# Patient Record
Sex: MALE | Race: WHITE | NOT HISPANIC OR LATINO | Employment: OTHER | URBAN - METROPOLITAN AREA
[De-identification: names, ages, dates, MRNs, and addresses within clinical notes are randomized per-mention and may not be internally consistent; named-entity substitution may affect disease eponyms.]

---

## 2018-01-12 NOTE — MISCELLANEOUS
Message  GI Reminder Recall [facility]:   Date: 04/19/2016   Dear Jodie Ojeda:     Review of our records shows you are due for the following: Colonoscopy  Please call the following office to schedule your appointment:   [address] [facility], [address], [place], [address] [place] [address]  [phone]  We look forward to hearing from you! Sincerely,         Signatures   Electronically signed by :  Karlo Coronado, ; Apr 19 2016  3:42PM EST                       (Author)

## 2023-11-16 ENCOUNTER — APPOINTMENT (EMERGENCY)
Dept: RADIOLOGY | Facility: HOSPITAL | Age: 88
End: 2023-11-16
Payer: COMMERCIAL

## 2023-11-16 ENCOUNTER — HOSPITAL ENCOUNTER (INPATIENT)
Facility: HOSPITAL | Age: 88
LOS: 5 days | Discharge: HOME HEALTH | End: 2023-11-21
Attending: EMERGENCY MEDICINE | Admitting: INTERNAL MEDICINE
Payer: COMMERCIAL

## 2023-11-16 DIAGNOSIS — E87.20 METABOLIC ACIDOSIS, NAG, BICARBONATE LOSSES: ICD-10-CM

## 2023-11-16 DIAGNOSIS — R19.5 OCCULT BLOOD POSITIVE STOOL: ICD-10-CM

## 2023-11-16 DIAGNOSIS — D64.9 ANEMIA REQUIRING TRANSFUSIONS: ICD-10-CM

## 2023-11-16 DIAGNOSIS — I50.9 HEART FAILURE (HCC): ICD-10-CM

## 2023-11-16 DIAGNOSIS — I35.1 MODERATE AORTIC REGURGITATION: ICD-10-CM

## 2023-11-16 DIAGNOSIS — R53.83 OTHER FATIGUE: Primary | ICD-10-CM

## 2023-11-16 DIAGNOSIS — J90 PLEURAL EFFUSION: ICD-10-CM

## 2023-11-16 DIAGNOSIS — R53.1 GENERALIZED WEAKNESS: ICD-10-CM

## 2023-11-16 PROBLEM — E87.1 HYPONATREMIA: Status: ACTIVE | Noted: 2023-11-16

## 2023-11-16 PROBLEM — C61 PROSTATE CANCER (HCC): Status: ACTIVE | Noted: 2023-11-16

## 2023-11-16 PROBLEM — E11.9 DIABETES (HCC): Status: ACTIVE | Noted: 2023-11-16

## 2023-11-16 LAB
2HR DELTA HS TROPONIN: 1 NG/L
4HR DELTA HS TROPONIN: 4 NG/L
ABO GROUP BLD: NORMAL
ABO GROUP BLD: NORMAL
ALBUMIN SERPL BCP-MCNC: 3.1 G/DL (ref 3.5–5)
ALP SERPL-CCNC: 293 U/L (ref 34–104)
ALT SERPL W P-5'-P-CCNC: 13 U/L (ref 7–52)
ANION GAP SERPL CALCULATED.3IONS-SCNC: 8 MMOL/L
AST SERPL W P-5'-P-CCNC: 14 U/L (ref 13–39)
BACTERIA UR QL AUTO: ABNORMAL /HPF
BASE EX.OXY STD BLDV CALC-SCNC: 55.7 % (ref 60–80)
BASE EXCESS BLDV CALC-SCNC: -8.1 MMOL/L
BASOPHILS # BLD AUTO: 0.04 THOUSANDS/ÂΜL (ref 0–0.1)
BASOPHILS NFR BLD AUTO: 1 % (ref 0–1)
BILIRUB SERPL-MCNC: 0.26 MG/DL (ref 0.2–1)
BILIRUB UR QL STRIP: NEGATIVE
BLD GP AB SCN SERPL QL: NEGATIVE
BUN SERPL-MCNC: 42 MG/DL (ref 5–25)
CALCIUM ALBUM COR SERPL-MCNC: 8.2 MG/DL (ref 8.3–10.1)
CALCIUM SERPL-MCNC: 7.5 MG/DL (ref 8.4–10.2)
CARDIAC TROPONIN I PNL SERPL HS: 11 NG/L
CARDIAC TROPONIN I PNL SERPL HS: 12 NG/L
CARDIAC TROPONIN I PNL SERPL HS: 15 NG/L
CHLORIDE SERPL-SCNC: 104 MMOL/L (ref 96–108)
CLARITY UR: ABNORMAL
CO2 SERPL-SCNC: 17 MMOL/L (ref 21–32)
COLOR UR: YELLOW
CREAT SERPL-MCNC: 1.29 MG/DL (ref 0.6–1.3)
EOSINOPHIL # BLD AUTO: 0.1 THOUSAND/ÂΜL (ref 0–0.61)
EOSINOPHIL NFR BLD AUTO: 1 % (ref 0–6)
ERYTHROCYTE [DISTWIDTH] IN BLOOD BY AUTOMATED COUNT: 15.5 % (ref 11.6–15.1)
GFR SERPL CREATININE-BSD FRML MDRD: 49 ML/MIN/1.73SQ M
GLUCOSE SERPL-MCNC: 129 MG/DL (ref 65–140)
GLUCOSE SERPL-MCNC: 196 MG/DL (ref 65–140)
GLUCOSE UR STRIP-MCNC: NEGATIVE MG/DL
HCO3 BLDV-SCNC: 17.6 MMOL/L (ref 24–30)
HCT VFR BLD AUTO: 23.3 % (ref 36.5–49.3)
HGB BLD-MCNC: 7.5 G/DL (ref 12–17)
HGB UR QL STRIP.AUTO: ABNORMAL
HYALINE CASTS #/AREA URNS LPF: ABNORMAL /LPF
IMM GRANULOCYTES # BLD AUTO: 0.15 THOUSAND/UL (ref 0–0.2)
IMM GRANULOCYTES NFR BLD AUTO: 2 % (ref 0–2)
KETONES UR STRIP-MCNC: NEGATIVE MG/DL
LACTATE SERPL-SCNC: 0.4 MMOL/L (ref 0.5–2)
LEUKOCYTE ESTERASE UR QL STRIP: ABNORMAL
LYMPHOCYTES # BLD AUTO: 2.89 THOUSANDS/ÂΜL (ref 0.6–4.47)
LYMPHOCYTES NFR BLD AUTO: 41 % (ref 14–44)
MCH RBC QN AUTO: 31.3 PG (ref 26.8–34.3)
MCHC RBC AUTO-ENTMCNC: 32.2 G/DL (ref 31.4–37.4)
MCV RBC AUTO: 97 FL (ref 82–98)
MONOCYTES # BLD AUTO: 0.79 THOUSAND/ÂΜL (ref 0.17–1.22)
MONOCYTES NFR BLD AUTO: 11 % (ref 4–12)
MUCOUS THREADS UR QL AUTO: ABNORMAL
NEUTROPHILS # BLD AUTO: 3.13 THOUSANDS/ÂΜL (ref 1.85–7.62)
NEUTS SEG NFR BLD AUTO: 44 % (ref 43–75)
NITRITE UR QL STRIP: NEGATIVE
NON-SQ EPI CELLS URNS QL MICRO: ABNORMAL /HPF
NRBC BLD AUTO-RTO: 0 /100 WBCS
O2 CT BLDV-SCNC: 7 ML/DL
PCO2 BLDV: 36.8 MM HG (ref 42–50)
PH BLDV: 7.3 [PH] (ref 7.3–7.4)
PH UR STRIP.AUTO: 6 [PH]
PLATELET # BLD AUTO: 184 THOUSANDS/UL (ref 149–390)
PMV BLD AUTO: 10.1 FL (ref 8.9–12.7)
PO2 BLDV: 33.2 MM HG (ref 35–45)
POTASSIUM SERPL-SCNC: 4.9 MMOL/L (ref 3.5–5.3)
PROT SERPL-MCNC: 5.5 G/DL (ref 6.4–8.4)
PROT UR STRIP-MCNC: ABNORMAL MG/DL
RBC # BLD AUTO: 2.4 MILLION/UL (ref 3.88–5.62)
RBC #/AREA URNS AUTO: ABNORMAL /HPF
RH BLD: POSITIVE
RH BLD: POSITIVE
SODIUM SERPL-SCNC: 129 MMOL/L (ref 135–147)
SP GR UR STRIP.AUTO: 1.01 (ref 1–1.03)
SPECIMEN EXPIRATION DATE: NORMAL
UROBILINOGEN UR STRIP-ACNC: <2 MG/DL
WBC # BLD AUTO: 7.1 THOUSAND/UL (ref 4.31–10.16)
WBC #/AREA URNS AUTO: ABNORMAL /HPF

## 2023-11-16 PROCEDURE — 85018 HEMOGLOBIN: CPT

## 2023-11-16 PROCEDURE — 86850 RBC ANTIBODY SCREEN: CPT

## 2023-11-16 PROCEDURE — 85025 COMPLETE CBC W/AUTO DIFF WBC: CPT | Performed by: EMERGENCY MEDICINE

## 2023-11-16 PROCEDURE — 30233N1 TRANSFUSION OF NONAUTOLOGOUS RED BLOOD CELLS INTO PERIPHERAL VEIN, PERCUTANEOUS APPROACH: ICD-10-PCS | Performed by: HOSPITALIST

## 2023-11-16 PROCEDURE — 87086 URINE CULTURE/COLONY COUNT: CPT

## 2023-11-16 PROCEDURE — 86900 BLOOD TYPING SEROLOGIC ABO: CPT

## 2023-11-16 PROCEDURE — 80053 COMPREHEN METABOLIC PANEL: CPT | Performed by: EMERGENCY MEDICINE

## 2023-11-16 PROCEDURE — 86920 COMPATIBILITY TEST SPIN: CPT

## 2023-11-16 PROCEDURE — 87077 CULTURE AEROBIC IDENTIFY: CPT

## 2023-11-16 PROCEDURE — 96365 THER/PROPH/DIAG IV INF INIT: CPT

## 2023-11-16 PROCEDURE — P9016 RBC LEUKOCYTES REDUCED: HCPCS

## 2023-11-16 PROCEDURE — 87186 SC STD MICRODIL/AGAR DIL: CPT

## 2023-11-16 PROCEDURE — 71045 X-RAY EXAM CHEST 1 VIEW: CPT

## 2023-11-16 PROCEDURE — 83605 ASSAY OF LACTIC ACID: CPT | Performed by: EMERGENCY MEDICINE

## 2023-11-16 PROCEDURE — 36415 COLL VENOUS BLD VENIPUNCTURE: CPT

## 2023-11-16 PROCEDURE — 82948 REAGENT STRIP/BLOOD GLUCOSE: CPT

## 2023-11-16 PROCEDURE — 99223 1ST HOSP IP/OBS HIGH 75: CPT | Performed by: HOSPITALIST

## 2023-11-16 PROCEDURE — 99285 EMERGENCY DEPT VISIT HI MDM: CPT

## 2023-11-16 PROCEDURE — 86901 BLOOD TYPING SEROLOGIC RH(D): CPT

## 2023-11-16 PROCEDURE — 84484 ASSAY OF TROPONIN QUANT: CPT | Performed by: EMERGENCY MEDICINE

## 2023-11-16 PROCEDURE — 0241U HB NFCT DS VIR RESP RNA 4 TRGT: CPT

## 2023-11-16 PROCEDURE — 82805 BLOOD GASES W/O2 SATURATION: CPT

## 2023-11-16 PROCEDURE — 96368 THER/DIAG CONCURRENT INF: CPT

## 2023-11-16 PROCEDURE — 81001 URINALYSIS AUTO W/SCOPE: CPT

## 2023-11-16 PROCEDURE — 36430 TRANSFUSION BLD/BLD COMPNT: CPT

## 2023-11-16 PROCEDURE — 93005 ELECTROCARDIOGRAM TRACING: CPT

## 2023-11-16 PROCEDURE — 99291 CRITICAL CARE FIRST HOUR: CPT | Performed by: EMERGENCY MEDICINE

## 2023-11-16 RX ORDER — SODIUM BICARBONATE 650 MG/1
650 TABLET ORAL 2 TIMES DAILY
Status: ON HOLD | COMMUNITY
Start: 2023-11-16 | End: 2023-11-21

## 2023-11-16 RX ORDER — DIPHENOXYLATE HYDROCHLORIDE AND ATROPINE SULFATE 2.5; .025 MG/1; MG/1
1 TABLET ORAL DAILY
COMMUNITY

## 2023-11-16 RX ORDER — ACETAMINOPHEN 500 MG
TABLET ORAL
COMMUNITY
End: 2023-11-16

## 2023-11-16 RX ORDER — POLYETHYLENE GLYCOL 3350, SODIUM SULFATE ANHYDROUS, SODIUM BICARBONATE, SODIUM CHLORIDE, POTASSIUM CHLORIDE 227.1; 21.5; 6.36; 5.53; .754 G/L; G/L; G/L; G/L; G/L
POWDER, FOR SOLUTION ORAL
COMMUNITY
Start: 2014-07-29 | End: 2023-11-16

## 2023-11-16 RX ORDER — INSULIN LISPRO 100 [IU]/ML
1-6 INJECTION, SOLUTION INTRAVENOUS; SUBCUTANEOUS
Status: DISCONTINUED | OUTPATIENT
Start: 2023-11-16 | End: 2023-11-21 | Stop reason: HOSPADM

## 2023-11-16 RX ORDER — HYDROCHLOROTHIAZIDE 25 MG/1
TABLET ORAL
COMMUNITY
End: 2023-11-16

## 2023-11-16 RX ORDER — VERAPAMIL HYDROCHLORIDE 240 MG/1
CAPSULE, EXTENDED RELEASE ORAL
COMMUNITY
End: 2023-11-16

## 2023-11-16 RX ORDER — AMLODIPINE BESYLATE 5 MG/1
1 TABLET ORAL DAILY
COMMUNITY
Start: 2023-09-13

## 2023-11-16 RX ORDER — ROSUVASTATIN CALCIUM 10 MG/1
1 TABLET, COATED ORAL DAILY
COMMUNITY
Start: 2023-06-21

## 2023-11-16 RX ORDER — RAMIPRIL 10 MG/1
CAPSULE ORAL
COMMUNITY
End: 2023-11-16

## 2023-11-16 RX ORDER — MAGNESIUM CARB/ALUMINUM HYDROX 105-160MG
TABLET,CHEWABLE ORAL
COMMUNITY
Start: 2014-07-29 | End: 2023-11-16

## 2023-11-16 RX ORDER — BICALUTAMIDE 50 MG/1
50 TABLET, FILM COATED ORAL DAILY
Status: DISCONTINUED | OUTPATIENT
Start: 2023-11-17 | End: 2023-11-21 | Stop reason: HOSPADM

## 2023-11-16 RX ORDER — LISINOPRIL 20 MG/1
20 TABLET ORAL DAILY
COMMUNITY
Start: 2023-11-15 | End: 2023-11-16

## 2023-11-16 RX ORDER — SODIUM BICARBONATE 650 MG/1
650 TABLET ORAL 2 TIMES DAILY
Status: DISCONTINUED | OUTPATIENT
Start: 2023-11-16 | End: 2023-11-18

## 2023-11-16 RX ORDER — PANTOPRAZOLE SODIUM 20 MG/1
20 TABLET, DELAYED RELEASE ORAL
Status: DISCONTINUED | OUTPATIENT
Start: 2023-11-17 | End: 2023-11-17

## 2023-11-16 RX ORDER — FERROUS SULFATE 325(65) MG
1 TABLET ORAL
COMMUNITY

## 2023-11-16 RX ORDER — PRAVASTATIN SODIUM 80 MG/1
80 TABLET ORAL
Status: DISCONTINUED | OUTPATIENT
Start: 2023-11-17 | End: 2023-11-21 | Stop reason: HOSPADM

## 2023-11-16 RX ORDER — CALCIUM GLUCONATE 20 MG/ML
1 INJECTION, SOLUTION INTRAVENOUS ONCE
Status: COMPLETED | OUTPATIENT
Start: 2023-11-16 | End: 2023-11-16

## 2023-11-16 RX ORDER — ENOXAPARIN SODIUM 100 MG/ML
40 INJECTION SUBCUTANEOUS DAILY
Status: DISCONTINUED | OUTPATIENT
Start: 2023-11-17 | End: 2023-11-16

## 2023-11-16 RX ORDER — CARVEDILOL 25 MG/1
25 TABLET ORAL
COMMUNITY
Start: 2023-05-24

## 2023-11-16 RX ORDER — ATROPINE SULFATE 0.1 MG/ML
1 SYRINGE (ML) INJECTION ONCE
Status: COMPLETED | OUTPATIENT
Start: 2023-11-16 | End: 2023-11-16

## 2023-11-16 RX ORDER — ALFUZOSIN HYDROCHLORIDE 10 MG/1
TABLET, EXTENDED RELEASE ORAL
COMMUNITY
End: 2023-11-16

## 2023-11-16 RX ORDER — POTASSIUM CHLORIDE 1.5 G/1.58G
POWDER, FOR SOLUTION ORAL
COMMUNITY
End: 2023-11-16

## 2023-11-16 RX ORDER — AMLODIPINE BESYLATE 5 MG/1
5 TABLET ORAL DAILY
Status: DISCONTINUED | OUTPATIENT
Start: 2023-11-17 | End: 2023-11-21 | Stop reason: HOSPADM

## 2023-11-16 RX ORDER — DOCUSATE SODIUM 100 MG/1
100 CAPSULE, LIQUID FILLED ORAL 2 TIMES DAILY
COMMUNITY

## 2023-11-16 RX ORDER — OMEPRAZOLE 20 MG/1
1 CAPSULE, DELAYED RELEASE ORAL 2 TIMES DAILY
COMMUNITY
Start: 2014-09-04

## 2023-11-16 RX ORDER — BICALUTAMIDE 50 MG/1
50 TABLET, FILM COATED ORAL DAILY
COMMUNITY
Start: 2023-10-09 | End: 2024-10-08

## 2023-11-16 RX ORDER — SODIUM CHLORIDE 9 MG/ML
75 INJECTION, SOLUTION INTRAVENOUS CONTINUOUS
Status: DISCONTINUED | OUTPATIENT
Start: 2023-11-16 | End: 2023-11-17

## 2023-11-16 RX ORDER — FERROUS SULFATE 325(65) MG
325 TABLET ORAL
Status: DISCONTINUED | OUTPATIENT
Start: 2023-11-17 | End: 2023-11-21 | Stop reason: HOSPADM

## 2023-11-16 RX ORDER — FLUTICASONE PROPIONATE 50 MCG
2 SPRAY, SUSPENSION (ML) NASAL DAILY
Status: DISCONTINUED | OUTPATIENT
Start: 2023-11-17 | End: 2023-11-21 | Stop reason: HOSPADM

## 2023-11-16 RX ORDER — CARVEDILOL 12.5 MG/1
25 TABLET ORAL 2 TIMES DAILY WITH MEALS
Status: DISCONTINUED | OUTPATIENT
Start: 2023-11-17 | End: 2023-11-21 | Stop reason: HOSPADM

## 2023-11-16 RX ORDER — FLUTICASONE PROPIONATE 50 MCG
2 SPRAY, SUSPENSION (ML) NASAL DAILY
COMMUNITY

## 2023-11-16 RX ORDER — SOD.CHLORID/POTASSIUM CHLORIDE 287-180-15
2 TABLET ORAL DAILY
COMMUNITY
Start: 2023-09-27

## 2023-11-16 RX ORDER — DOCUSATE SODIUM 100 MG/1
100 CAPSULE, LIQUID FILLED ORAL 2 TIMES DAILY
Status: DISCONTINUED | OUTPATIENT
Start: 2023-11-16 | End: 2023-11-21 | Stop reason: HOSPADM

## 2023-11-16 RX ADMIN — SODIUM BICARBONATE 650 MG TABLET 650 MG: at 23:27

## 2023-11-16 RX ADMIN — INSULIN LISPRO 2 UNITS: 100 INJECTION, SOLUTION INTRAVENOUS; SUBCUTANEOUS at 23:27

## 2023-11-16 RX ADMIN — CALCIUM GLUCONATE 1 G: 20 INJECTION, SOLUTION INTRAVENOUS at 14:50

## 2023-11-16 RX ADMIN — SODIUM CHLORIDE 75 ML/HR: 0.9 INJECTION, SOLUTION INTRAVENOUS at 20:12

## 2023-11-16 RX ADMIN — CEFTRIAXONE SODIUM 1000 MG: 10 INJECTION, POWDER, FOR SOLUTION INTRAVENOUS at 14:42

## 2023-11-16 NOTE — ED PROVIDER NOTES
History  Chief Complaint   Patient presents with    Weakness - Generalized     Called d/t increased weakness, SOB with exertion and some CP. Pt has Cancer with generalized mets. Started new Chemo med recently and since then has no energy and feels like he is dragging. Upon arrival EMS noted him to be sanju and hypotensive. Pushed Atropine in route and pt converted to AFIB in the 70's     Patient is a 70-year-old male with metastatic prostate cancer who presented to the ED for weakness. Patient stated that he has had increased weakness for the last 2 weeks and associated worsening shortness of breath on exertion. Patient states he has had some degree of shortness of breath on exertion before but has been significantly worse in the last couple weeks. He came to the ED via EMS this morning as he noticed in the last couple days the weakness and shortness of breath on exertion has been significantly worse even compared to the last 2 weeks and his family member was concerned and called EMS. He also stated that he had 1 episode of chest pain this morning shortly after awakening while lying in bed. Chest pain was described as 4 out of 10, substernal, nonradiating. He stated that he thinks he has had some mild chest pain in the past but not sure. Notably, he states that he recently switched to a new medication for his metastatic prostate cancer, bicalutamide. He also states he has been eating less the last couple days and has had decreased appetite. He denies any fevers, chills or other systemic symptoms. Prior to Admission Medications   Prescriptions Last Dose Informant Patient Reported? Taking?    Cholecalciferol 25 MCG (1000 UT) capsule   Yes No   Sig: Take 1,000 Units by mouth daily   OMEGA-3 FATTY ACIDS-VITAMIN E PO   Yes No   Sig: Take 1 tablet by mouth daily   amLODIPine (NORVASC) 5 mg tablet 11/16/2023  Yes Yes   Sig: Take 1 tablet by mouth daily   apixaban (ELIQUIS) 5 mg 11/16/2023  Yes Yes   Sig: Take 5 mg by mouth 2 (two) times a day   bicalutamide (CASODEX) 50 mg tablet 2023  Yes Yes   Sig: Take 50 mg by mouth daily   carvedilol (COREG) 25 mg tablet 2023  Yes Yes   Sig: Take 25 mg by mouth   docusate sodium (COLACE) 100 mg capsule Past Week  Yes Yes   Sig: Take 100 mg by mouth 2 (two) times a day   ferrous sulfate 325 (65 Fe) mg tablet   Yes No   Sig: Take 1 tablet by mouth daily with breakfast   fluticasone (FLONASE) 50 mcg/act nasal spray Past Week  Yes Yes   Si sprays into each nostril daily   multivitamin (THERAGRAN) TABS   Yes No   Sig: Take 1 tablet by mouth daily   omeprazole (PriLOSEC) 20 mg delayed release capsule 2023  Yes Yes   Sig: Take 1 capsule by mouth 2 (two) times a day   oral electrolytes (THERMOTABS) TABS   Yes Yes   Sig: Take 2 tablets by mouth daily   rosuvastatin (CRESTOR) 10 MG tablet   Yes Yes   Sig: Take 1 tablet by mouth daily   sitaGLIPtin (JANUVIA) 50 mg tablet 11/15/2023  Yes Yes   Sig: Take 50 mg by mouth daily   sodium bicarbonate 650 mg tablet 2023  Yes Yes   Sig: Take 650 mg by mouth 2 (two) times a day      Facility-Administered Medications: None       No past medical history on file. No past surgical history on file. No family history on file. I have reviewed and agree with the history as documented. No existing history information found. No existing history information found. Review of Systems   Constitutional:  Positive for appetite change and fatigue. Negative for chills. HENT: Negative. Respiratory:  Positive for shortness of breath. SOB on exertion   Cardiovascular:  Positive for chest pain. Negative for palpitations. Gastrointestinal:  Negative for abdominal pain, nausea and vomiting. Genitourinary:  Negative for dysuria, flank pain and urgency. Musculoskeletal:  Negative for arthralgias and neck pain. Skin:  Negative for color change and pallor.    Neurological:  Negative for dizziness, syncope and light-headedness. Psychiatric/Behavioral:  Negative for agitation, behavioral problems and confusion. All other systems reviewed and are negative. Physical Exam  ED Triage Vitals [11/16/23 1212]   Temperature Pulse Respirations Blood Pressure SpO2   97.5 °F (36.4 °C) 60 20 116/56 96 %      Temp Source Heart Rate Source Patient Position - Orthostatic VS BP Location FiO2 (%)   Oral Monitor Lying Right arm --      Pain Score       No Pain             Orthostatic Vital Signs  Vitals:    11/16/23 1800 11/16/23 1830 11/16/23 1845 11/16/23 1948   BP: 152/85 151/89 150/80 158/84   Pulse: 66 100 99 100   Patient Position - Orthostatic VS: Lying Lying Lying        Physical Exam  Vitals and nursing note reviewed. Constitutional:       Appearance: Normal appearance. HENT:      Head: Normocephalic and atraumatic. Nose: Nose normal.      Mouth/Throat:      Mouth: Mucous membranes are moist.      Pharynx: Oropharynx is clear. Eyes:      Extraocular Movements: Extraocular movements intact. Conjunctiva/sclera: Conjunctivae normal.      Pupils: Pupils are equal, round, and reactive to light. Cardiovascular:      Rate and Rhythm: Normal rate and regular rhythm. Pulses: Normal pulses. Heart sounds: Normal heart sounds. Pulmonary:      Effort: Pulmonary effort is normal.      Breath sounds: Normal breath sounds. Abdominal:      General: Abdomen is flat. Bowel sounds are normal.      Palpations: Abdomen is soft. Tenderness: There is no abdominal tenderness. Skin:     General: Skin is warm and dry. Capillary Refill: Capillary refill takes less than 2 seconds. Neurological:      General: No focal deficit present. Mental Status: He is alert and oriented to person, place, and time. Psychiatric:         Mood and Affect: Mood normal.         Behavior: Behavior normal.         Thought Content:  Thought content normal.         ED Medications  Medications   enoxaparin (LOVENOX) subcutaneous injection 40 mg (has no administration in time range)   sodium chloride 0.9 % infusion (75 mL/hr Intravenous New Bag 11/16/23 2012)   atropine (FOR EMS ONLY) 1 mg/10 mL injection 1 mg (0 mg Does not apply Given to EMS 11/16/23 1210)   calcium gluconate 1 g in sodium chloride 0.9% 50 mL (premix) (0 g Intravenous Stopped 11/16/23 1520)   ceftriaxone (ROCEPHIN) 1 g/50 mL in dextrose IVPB (0 mg Intravenous Stopped 11/16/23 1514)       Diagnostic Studies  Results Reviewed       Procedure Component Value Units Date/Time    Hemoglobin [[id number]]     Lab Status: No result Specimen: Blood     FLU/RSV/COVID - if FLU/RSV clinically relevant [[id number]] Collected: 11/16/23 1955    Lab Status: No result Specimen: Nares from Nasopharyngeal Swab     Platelet count [[id number]]     Lab Status: No result Specimen: Blood     HS Troponin I 4hr [[id number]]  (Normal) Collected: 11/16/23 1635    Lab Status: Final result Specimen: Blood from Arm, Right Updated: 11/16/23 1710     hs TnI 4hr 15 ng/L      Delta 4hr hsTnI 4 ng/L     Lactic acid, plasma (w/reflex if result > 2.0) [[id number]]  (Abnormal) Collected: 11/16/23 1440    Lab Status: Final result Specimen: Blood from Arm, Left Updated: 11/16/23 1509     LACTIC ACID 0.4 mmol/L     Narrative:      Result may be elevated if tourniquet was used during collection.     HS Troponin I 2hr [[id number]]  (Normal) Collected: 11/16/23 1353    Lab Status: Final result Specimen: Blood from Arm, Left Updated: 11/16/23 1434     hs TnI 2hr 12 ng/L      Delta 2hr hsTnI 1 ng/L     Calcium, ionized [[id number]]     Lab Status: No result Specimen: Blood     Blood gas, venous [[id number]]  (Abnormal) Collected: 11/16/23 1349    Lab Status: Final result Specimen: Blood from Arm, Left Updated: 11/16/23 1401     pH, [place] 7.298     pCO2, [place] 36.8 mm Hg      pO2, [place] 33.2 mm Hg      HCO3, [place] 17.6 mmol/L      Base Excess, [place] -8.1 mmol/L      O2 Content, [place] 7.0 ml/dL      O2 HGB, VENOUS 55.7 % Urine Microscopic [[id number]]  (Abnormal) Collected: 11/16/23 1336    Lab Status: Final result Specimen: Urine, Right Nephrostomy Updated: 11/16/23 1352     RBC, UA 10-20 /hpf      WBC, UA Innumerable /hpf      Epithelial Cells None Seen /hpf      Bacteria, UA Occasional /hpf      MUCUS THREADS Occasional     Hyaline Casts, UA 0-3 /lpf     Urine culture [[id number]] Collected: 11/16/23 1336    Lab Status:  In process Specimen: Urine, Right Nephrostomy Updated: 11/16/23 1352    UA w Reflex to Microscopic w Reflex to Culture [[id number]]  (Abnormal) Collected: 11/16/23 1336    Lab Status: Final result Specimen: Urine, Right Nephrostomy Updated: 11/16/23 1350     Color, UA Yellow     Clarity, UA Turbid     Specific Gravity, UA 1.015     pH, UA 6.0     Leukocytes, UA Large     Nitrite, UA Negative     Protein, UA 70 (1+) mg/dl      Glucose, UA Negative mg/dl      Ketones, UA Negative mg/dl      Urobilinogen, UA <2.0 mg/dl      Bilirubin, UA Negative     Occult Blood, UA Small    HS Troponin 0hr (reflex protocol) [[id number]]  (Normal) Collected: 11/16/23 1221    Lab Status: Final result Specimen: Blood from Arm, Left Updated: 11/16/23 1258     hs TnI 0hr 11 ng/L     Comprehensive metabolic panel [[id number]]  (Abnormal) Collected: 11/16/23 1221    Lab Status: Final result Specimen: Blood from Arm, Left Updated: 11/16/23 1251     Sodium 129 mmol/L      Potassium 4.9 mmol/L      Chloride 104 mmol/L      CO2 17 mmol/L      ANION GAP 8 mmol/L      BUN 42 mg/dL      Creatinine 1.29 mg/dL      Glucose 129 mg/dL      Calcium 7.5 mg/dL      Corrected Calcium 8.2 mg/dL      AST 14 U/L      ALT 13 U/L      Alkaline Phosphatase 293 U/L      Total Protein 5.5 g/dL      Albumin 3.1 g/dL      Total Bilirubin 0.26 mg/dL      eGFR 49 ml/min/1.73sq m     Narrative:      [place] guidelines for Chronic Kidney Disease (CKD):     Stage 1 with normal or high GFR (GFR > 90 mL/min/1.73 square meters)    Stage 2 Mild CKD (GFR = 60-89 mL/min/1.73 square meters)    Stage 3A Moderate CKD (GFR = 45-59 mL/min/1.73 square meters)    Stage 3B Moderate CKD (GFR = 30-44 mL/min/1.73 square meters)    Stage 4 Severe CKD (GFR = 15-29 mL/min/1.73 square meters)    Stage 5 End Stage CKD (GFR <15 mL/min/1.73 square meters)  Note: GFR calculation is accurate only with a steady state creatinine    CBC and differential [[id number]]  (Abnormal) Collected: 11/16/23 1221    Lab Status: Final result Specimen: Blood from Arm, Left Updated: 11/16/23 1237     WBC 7.10 Thousand/uL      RBC 2.40 Million/uL      Hemoglobin 7.5 g/dL      Hematocrit 23.3 %      MCV 97 fL      MCH 31.3 pg      MCHC 32.2 g/dL      RDW 15.5 %      MPV 10.1 fL      Platelets 423 Thousands/uL      nRBC 0 /100 WBCs      Neutrophils Relative 44 %      Immat GRANS % 2 %      Lymphocytes Relative 41 %      Monocytes Relative 11 %      Eosinophils Relative 1 %      Basophils Relative 1 %      Neutrophils Absolute 3.13 Thousands/µL      Immature Grans Absolute 0.15 Thousand/uL      Lymphocytes Absolute 2.89 Thousands/µL      Monocytes Absolute 0.79 Thousand/µL      Eosinophils Absolute 0.10 Thousand/µL      Basophils Absolute 0.04 Thousands/µL                    XR chest 1 view portable   Final Result by Valorie Joseph MD (11/16 7576)      Changes above are consistent with pulmonary edema and small bilateral pleural effusions. The study was marked in [facility] for immediate notification. Workstation performed: [id number]               Procedures  Procedures      ED Course  ED Course as of 11/16/23 2013   Thu Nov 16, 2023   1221 Hemoglobin(!): 7.5  Significantly reduced from CBC approximately 1 week ago with hemoglobin 10.5 . type and screen ordered   1336 WBC, UA(!): Innumerable  Suggestive of UTI, ceftriaxone ordered   1349 Blood gas, venous(!)  Suggestive of metabolic acidosis, lactate ordered   1440 Lactic acid, plasma (w/reflex if result > 2.0)(! )  Lactate negative   1442 Ceftriaxone given for UTI   1450 Calcium gluconate given in the face of low calcium and impending transfusion   1925 Patient consented for blood transfusion. Blood transfusion given for symptomatic anemia                             SBIRT 22yo+      Flowsheet Row Most Recent Value   Initial Alcohol Screen: US AUDIT-C     1. How often do you have a drink containing alcohol? 0 Filed at: 11/16/2023 1213   2. How many drinks containing alcohol do you have on a typical day you are drinking? 0 Filed at: 11/16/2023 1213   3a. Male UNDER 65: How often do you have five or more drinks on one occasion? 0 Filed at: 11/16/2023 1213   3b. FEMALE Any Age, or MALE 65+: How often do you have 4 or more drinks on one occassion? 0 Filed at: 11/16/2023 1213   Audit-C Score 0 Filed at: 11/16/2023 1213   [place]: How many times in the past year have you. .. Used an illegal drug or used a prescription medication for non-medical reasons? Never Filed at: 11/16/2023 1213                  Medical Decision Making  Patient is a 82-year-old male with metastatic prostate cancer who presented to the ED for weakness. Patient stated he has had increased weakness for the last 2 weeks and associated worsening shortness of breath on exertion. Patient states he has had some degree of shortness of breath on exertion before but has been significantly worse in the last couple weeks. He came to the ED via EMS this morning as he noticed in the last couple days the weakness and shortness of breath on exertion has been significantly worse even compared to the last 2 weeks and his family member was concerned and called EMS. He also stated that he had 1 episode of shortness of breath this morning shortly after awakening while lying in bed. Chest pain was described as 4 out of 10, substernal, nonradiating. He stated that he thinks he has had some mild chest pain in the past but not sure.   Notably, he states that he recently switched to a new medication for his metastatic prostate cancer, bicalutamide. He also states he has been eating less the last couple days and has had decreased appetite. He denies any fevers, chills or other systemic symptoms. Patient was found to have hemoglobin of 7.5. This was a significant decrease from his CBC approximately 1 week ago at [facility] which showed hemoglobin in the 10s. Patient was also significantly volume down based off the clinical picture. Consent for blood was done and patient was transfused 1 unit pRBCs in the face of symptomatic anemia. Patient was also given calcium for hypocalcemia especially given impending transfusion. Patient was found to have UA suggestive of UTI, empirically given ceftriaxone. EKG and troponin were unremarkable for ischemic process, doubt ACS/MI. Patient was admitted to medicine for further work-up and management of symptomatic anemia and UTI. Amount and/or Complexity of Data Reviewed  Labs: ordered. Radiology: ordered. Risk  Prescription drug management. Decision regarding hospitalization.           Disposition  Final diagnoses:   Other fatigue   Occult blood positive stool   Anemia requiring transfusions   Pleural effusion     Time reflects when diagnosis was documented in both MDM as applicable and the Disposition within this note       Time User Action Codes Description Comment    11/16/2023  4:15 PM Michael Bell [R53.83] Other fatigue     11/16/2023  4:16 PM Jerod Rummage Add [R19.5] Occult blood positive stool     11/16/2023  4:16 PM Jerod Rummage Add [D64.9] Anemia requiring transfusions     11/16/2023  4:16 PM Jerod Rummage Add [J18.9,  J91.8] Pleural effusion associated with pulmonary infection     11/16/2023  4:16 PM Virginia Griffith [J18.9,  J91.8] Pleural effusion associated with pulmonary infection     11/16/2023  4:16 PM Jerod Rummage Add [J90] Pleural effusion           ED Disposition       ED Disposition   Admit    Condition   Stable    Date/Time   Thu Nov 16, 2023 1615    Comment   Case was discussed with hospitalist and the patient's admission status was agreed to be Admission Status: inpatient status to the service of Dr. Au Session . Follow-up Information    None         Current Discharge Medication List        CONTINUE these medications which have NOT CHANGED    Details   amLODIPine (NORVASC) 5 mg tablet Take 1 tablet by mouth daily      apixaban (ELIQUIS) 5 mg Take 5 mg by mouth 2 (two) times a day      bicalutamide (CASODEX) 50 mg tablet Take 50 mg by mouth daily      carvedilol (COREG) 25 mg tablet Take 25 mg by mouth      docusate sodium (COLACE) 100 mg capsule Take 100 mg by mouth 2 (two) times a day      fluticasone (FLONASE) 50 mcg/act nasal spray 2 sprays into each nostril daily      omeprazole (PriLOSEC) 20 mg delayed release capsule Take 1 capsule by mouth 2 (two) times a day      oral electrolytes (THERMOTABS) TABS Take 2 tablets by mouth daily      rosuvastatin (CRESTOR) 10 MG tablet Take 1 tablet by mouth daily      sitaGLIPtin (JANUVIA) 50 mg tablet Take 50 mg by mouth daily      sodium bicarbonate 650 mg tablet Take 650 mg by mouth 2 (two) times a day      Cholecalciferol 25 MCG (1000 UT) capsule Take 1,000 Units by mouth daily      ferrous sulfate 325 (65 Fe) mg tablet Take 1 tablet by mouth daily with breakfast      multivitamin (THERAGRAN) TABS Take 1 tablet by mouth daily      OMEGA-3 FATTY ACIDS-VITAMIN E PO Take 1 tablet by mouth daily           No discharge procedures on file. PDMP Review       None             ED Provider  Attending physically available and evaluated Cristofer hWite. I managed the patient along with the ED Attending.     Electronically Signed by           Segun Bell MD  11/16/23 2013

## 2023-11-16 NOTE — ED ATTENDING ATTESTATION
11/16/2023  Dianna Asencio DO, saw and evaluated the patient. I have discussed the patient with the resident/non-physician practitioner and agree with the resident's/non-physician practitioner's findings, Plan of Care, and MDM as documented in the resident's/non-physician practitioner's note, except where noted. All available labs and Radiology studies were reviewed. I was present for key portions of any procedure(s) performed by the resident/non-physician practitioner and I was immediately available to provide assistance. 72-year-old male, past medical history metastatic prostate CA undergoing treatment, presenting to the emergency department after the last few days of increased fatigue. See resident note for full details. Vital signs stable. Patient resting comfortably. Alert and oriented x4. Conjunctiva pale. Lungs clear to auscultation. Heart regular rate and rhythm. Abdomen soft nontender. Ren in place. 72-year-old male presenting with fatigue likely secondary to symptomatic anemia with hemoglobin of 7.5. Hemoccult positive. No gross melena or hematochezia. Abdomen soft nontender. 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 However, given generalized fatigue, empirically provided Rocephin and send urine culture. 1 unit PRBC ordered for hemoglobin less than 8 that is symptomatic. Chest x-ray with concern for possible pulmonary edema however patient lying flat without orthopnea. Lungs clear to auscultation. Lower extremities with only scant edema. No indication of fluid overload at this time. EKG without acute pathology. Call placed to hospitalist medicine who accepted their care. At this point I agree with the current assessment done in the Emergency Department.   I have conducted an independent evaluation of this patient a history and physical is as follows:    ED Course  ED Course as of 11/16/23 1615   Thu Nov 16, 2023   1337 Hgb 9.7 on 11/4         Critical Care Time  CriticalCare Time    Date/Time: 11/16/2023 4:08 PM    Performed by: Benji Hennessy DO  Authorized by: Benji Hennessy DO    Critical care provider statement:     Critical care time (minutes):  40    Critical care time was exclusive of:  Separately billable procedures and treating other patients and teaching time    Critical care was necessary to treat or prevent imminent or life-threatening deterioration of the following conditions: hgb <8 requiring transfusion.     Critical care was time spent personally by me on the following activities:  Obtaining history from patient or surrogate, development of treatment plan with patient or surrogate, discussions with consultants, examination of patient, review of old charts, re-evaluation of patient's condition, ordering and review of radiographic studies, ordering and review of laboratory studies and ordering and performing treatments and interventions    I assumed direction of critical care for this patient from another provider in my specialty: no

## 2023-11-17 LAB
ABO GROUP BLD BPU: NORMAL
ALBUMIN SERPL BCP-MCNC: 3.2 G/DL (ref 3.5–5)
ALP SERPL-CCNC: 301 U/L (ref 34–104)
ALT SERPL W P-5'-P-CCNC: 12 U/L (ref 7–52)
ANION GAP SERPL CALCULATED.3IONS-SCNC: 9 MMOL/L
AST SERPL W P-5'-P-CCNC: 16 U/L (ref 13–39)
ATRIAL RATE: 76 BPM
BILIRUB SERPL-MCNC: 0.53 MG/DL (ref 0.2–1)
BPU ID: NORMAL
BUN SERPL-MCNC: 34 MG/DL (ref 5–25)
CALCIUM ALBUM COR SERPL-MCNC: 8.3 MG/DL (ref 8.3–10.1)
CALCIUM SERPL-MCNC: 7.7 MG/DL (ref 8.4–10.2)
CHLORIDE SERPL-SCNC: 106 MMOL/L (ref 96–108)
CO2 SERPL-SCNC: 16 MMOL/L (ref 21–32)
CREAT SERPL-MCNC: 0.95 MG/DL (ref 0.6–1.3)
CROSSMATCH: NORMAL
ERYTHROCYTE [DISTWIDTH] IN BLOOD BY AUTOMATED COUNT: 16.5 % (ref 11.6–15.1)
FERRITIN SERPL-MCNC: 40 NG/ML (ref 24–336)
FLUAV RNA RESP QL NAA+PROBE: NEGATIVE
FLUBV RNA RESP QL NAA+PROBE: NEGATIVE
GFR SERPL CREATININE-BSD FRML MDRD: 72 ML/MIN/1.73SQ M
GLUCOSE SERPL-MCNC: 106 MG/DL (ref 65–140)
GLUCOSE SERPL-MCNC: 108 MG/DL (ref 65–140)
GLUCOSE SERPL-MCNC: 108 MG/DL (ref 65–140)
GLUCOSE SERPL-MCNC: 128 MG/DL (ref 65–140)
GLUCOSE SERPL-MCNC: 158 MG/DL (ref 65–140)
HCT VFR BLD AUTO: 28.5 % (ref 36.5–49.3)
HGB BLD-MCNC: 9 G/DL (ref 12–17)
HGB BLD-MCNC: 9 G/DL (ref 12–17)
HGB BLD-MCNC: 9.3 G/DL (ref 12–17)
HGB BLD-MCNC: 9.4 G/DL (ref 12–17)
IRON SATN MFR SERPL: 42 % (ref 15–50)
IRON SERPL-MCNC: 113 UG/DL (ref 50–212)
MCH RBC QN AUTO: 31 PG (ref 26.8–34.3)
MCHC RBC AUTO-ENTMCNC: 33 G/DL (ref 31.4–37.4)
MCV RBC AUTO: 94 FL (ref 82–98)
PLATELET # BLD AUTO: 206 THOUSANDS/UL (ref 149–390)
PMV BLD AUTO: 10.2 FL (ref 8.9–12.7)
POTASSIUM SERPL-SCNC: 4.4 MMOL/L (ref 3.5–5.3)
PROT SERPL-MCNC: 5.8 G/DL (ref 6.4–8.4)
QRS AXIS: 112 DEGREES
QRSD INTERVAL: 148 MS
QT INTERVAL: 432 MS
QTC INTERVAL: 492 MS
RBC # BLD AUTO: 3.03 MILLION/UL (ref 3.88–5.62)
RSV RNA RESP QL NAA+PROBE: NEGATIVE
SARS-COV-2 RNA RESP QL NAA+PROBE: NEGATIVE
SODIUM SERPL-SCNC: 131 MMOL/L (ref 135–147)
T WAVE AXIS: 51 DEGREES
TIBC SERPL-MCNC: 266 UG/DL (ref 250–450)
UIBC SERPL-MCNC: 153 UG/DL (ref 155–355)
UNIT DISPENSE STATUS: NORMAL
UNIT PRODUCT CODE: NORMAL
UNIT PRODUCT VOLUME: 350 ML
UNIT RH: NORMAL
VENTRICULAR RATE: 78 BPM
WBC # BLD AUTO: 8.27 THOUSAND/UL (ref 4.31–10.16)

## 2023-11-17 PROCEDURE — 93010 ELECTROCARDIOGRAM REPORT: CPT | Performed by: INTERNAL MEDICINE

## 2023-11-17 PROCEDURE — 99223 1ST HOSP IP/OBS HIGH 75: CPT | Performed by: STUDENT IN AN ORGANIZED HEALTH CARE EDUCATION/TRAINING PROGRAM

## 2023-11-17 PROCEDURE — 82728 ASSAY OF FERRITIN: CPT

## 2023-11-17 PROCEDURE — 99232 SBSQ HOSP IP/OBS MODERATE 35: CPT | Performed by: INTERNAL MEDICINE

## 2023-11-17 PROCEDURE — C9113 INJ PANTOPRAZOLE SODIUM, VIA: HCPCS | Performed by: HOSPITALIST

## 2023-11-17 PROCEDURE — 80053 COMPREHEN METABOLIC PANEL: CPT

## 2023-11-17 PROCEDURE — 85027 COMPLETE CBC AUTOMATED: CPT

## 2023-11-17 PROCEDURE — 82948 REAGENT STRIP/BLOOD GLUCOSE: CPT

## 2023-11-17 PROCEDURE — 83550 IRON BINDING TEST: CPT

## 2023-11-17 PROCEDURE — 97530 THERAPEUTIC ACTIVITIES: CPT

## 2023-11-17 PROCEDURE — 85018 HEMOGLOBIN: CPT

## 2023-11-17 PROCEDURE — 97163 PT EVAL HIGH COMPLEX 45 MIN: CPT

## 2023-11-17 PROCEDURE — 99223 1ST HOSP IP/OBS HIGH 75: CPT | Performed by: INTERNAL MEDICINE

## 2023-11-17 PROCEDURE — 83540 ASSAY OF IRON: CPT

## 2023-11-17 RX ORDER — PANTOPRAZOLE SODIUM 40 MG/10ML
40 INJECTION, POWDER, LYOPHILIZED, FOR SOLUTION INTRAVENOUS EVERY 12 HOURS SCHEDULED
Status: DISCONTINUED | OUTPATIENT
Start: 2023-11-17 | End: 2023-11-21

## 2023-11-17 RX ORDER — BENZONATATE 100 MG/1
200 CAPSULE ORAL 3 TIMES DAILY
Status: DISCONTINUED | OUTPATIENT
Start: 2023-11-17 | End: 2023-11-21 | Stop reason: HOSPADM

## 2023-11-17 RX ADMIN — PANTOPRAZOLE SODIUM 40 MG: 40 INJECTION, POWDER, FOR SOLUTION INTRAVENOUS at 21:28

## 2023-11-17 RX ADMIN — DOCUSATE SODIUM 100 MG: 100 CAPSULE, LIQUID FILLED ORAL at 17:54

## 2023-11-17 RX ADMIN — FLUTICASONE PROPIONATE 2 SPRAY: 50 SPRAY, METERED NASAL at 09:55

## 2023-11-17 RX ADMIN — SODIUM BICARBONATE 650 MG TABLET 650 MG: at 09:55

## 2023-11-17 RX ADMIN — BENZONATATE 200 MG: 100 CAPSULE ORAL at 17:54

## 2023-11-17 RX ADMIN — DOCUSATE SODIUM 100 MG: 100 CAPSULE, LIQUID FILLED ORAL at 09:55

## 2023-11-17 RX ADMIN — BENZONATATE 200 MG: 100 CAPSULE ORAL at 21:29

## 2023-11-17 RX ADMIN — SODIUM BICARBONATE 650 MG TABLET 650 MG: at 17:54

## 2023-11-17 RX ADMIN — AMLODIPINE BESYLATE 5 MG: 5 TABLET ORAL at 09:55

## 2023-11-17 RX ADMIN — CARVEDILOL 25 MG: 12.5 TABLET, FILM COATED ORAL at 09:56

## 2023-11-17 RX ADMIN — PRAVASTATIN SODIUM 80 MG: 80 TABLET ORAL at 17:54

## 2023-11-17 RX ADMIN — FERROUS SULFATE TAB 325 MG (65 MG ELEMENTAL FE) 325 MG: 325 (65 FE) TAB at 09:55

## 2023-11-17 RX ADMIN — BICALUTAMIDE 50 MG: 50 TABLET, FILM COATED ORAL at 09:56

## 2023-11-17 RX ADMIN — SODIUM CHLORIDE 75 ML/HR: 0.9 INJECTION, SOLUTION INTRAVENOUS at 13:11

## 2023-11-17 RX ADMIN — PANTOPRAZOLE SODIUM 20 MG: 20 TABLET, DELAYED RELEASE ORAL at 05:22

## 2023-11-17 RX ADMIN — CARVEDILOL 25 MG: 12.5 TABLET, FILM COATED ORAL at 17:54

## 2023-11-17 NOTE — UTILIZATION REVIEW
Initial Clinical Review    Admission: Date/Time/Statement:   Admission Orders (From admission, onward)       Ordered        11/16/23 1617  INPATIENT ADMISSION  Once                          Orders Placed This Encounter   Procedures    INPATIENT ADMISSION     Standing Status:   Standing     Number of Occurrences:   1     Order Specific Question:   Level of Care     Answer:   Med Surg [16]     Order Specific Question:   Estimated length of stay     Answer:   More than 2 Midnights     Order Specific Question:   Certification     Answer:   I certify that inpatient services are medically necessary for this patient for a duration of greater than two midnights. See H&P and MD Progress Notes for additional information about the patient's course of treatment. ED Arrival Information       Expected   -    Arrival   11/16/2023 12:04    Acuity   Urgent              Means of arrival   Ambulance    Escorted by   [facility]    Admission type   Emergency              Arrival complaint   -             Chief Complaint   Patient presents with    Weakness - Generalized     Called d/t increased weakness, SOB with exertion and some CP. Pt has Cancer with generalized mets. Started new Chemo med recently and since then has no energy and feels like he is dragging. Upon arrival EMS noted him to be sanju and hypotensive. Pushed Atropine in route and pt converted to AFIB in the 70's       Initial Presentation: 82 y.o. 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 Reports dry cough, heavy nonpleuritic nonpositional on/off chest pain  . Patient reports he had difficulty getting out of bed  due to weakness and feels lightheaded. He has had poor oral intake over the last few days as well.  On EMS arrival, pt bradycardic, hypotensive and was given Atropine en route . On exam, dry mucous membranes, decreased breath sounds , reg heart rate, + 1 edema BLE. Urine clear yellow in  ileostomy. FOBT + in ED. Labs Hbg 7.5, BUN 42. Low Na 129. Metabolic acidosis on VBG . Anemia. Hyponatremia . Plan - PT/OT, Hgb q8h. GI consult, transfuse hgb <7, hold home Eliquis. IV PPI Continue home na bicarb. Gentle VF. Monitor Na . Date: 11/17   Day 2:  Ongoing Hgb monitoring q8h, CBC, TIBC panel , ferritin in am    GI consult- Pt transfused 1 U RBC's yesterday with hgb up to 9.4 today . BUN elevated suspicious for possible upper GI bleeding . Continue to hold Eliquis, last dose was 11/15/23. F/U iron studies . Elevated Alkphos likely second to metastatic disease to bone (extensive osseous mets noted on 10/31/23 CT) . No need for urgent endoscopy. Abdomen soft, non tender . Cardiology consult- SOB. no associated chest pain- Troponins normal. Check echo today . + elevated JVP,+1 BLE edema - reportedly stable . Lungs clear, decreased at bases . Pt reports 7 lb wt gain . Not on diuretic as was previously stopped 2/2 hyponatremia . Na improved today to 131 with IVF- NSS. Creat down to 0.95 from 1.29 on admission, baseline 0.8-1.0 . Continue to monitor BP, mildly hypertensive. Continue home amlodipine, Carvedilol. PT- level III minimal resource intensity . Pt is close to at his/her mobility baseline. He is at risk for falls based on hx of falls, impulsivity, impaired balance, limited sensation/neuropathy vs LE edema, and fear/retreat per pt. .   Pt fatigued during PT,     During this admission, pt would benefit from continued skilled inpatient PT in the acute care setting in order to address deficits .       ED Triage Vitals [11/16/23 1212]   Temperature Pulse Respirations Blood Pressure SpO2   97.5 °F (36.4 °C) 60 20 116/56 96 %      Temp Source Heart Rate Source Patient Position - Orthostatic VS BP Location FiO2 (%)   Oral Monitor Lying Right arm --      Pain Score       No Pain          Wt Readings from Last 1 Encounters:   11/17/23 78 kg (171 lb 15.3 oz)     Additional Vital Signs:   ate/Time Temp Pulse Resp BP MAP (mmHg) SpO2   11/17/23 07:10:50 97.1 °F (36.2 °C) Abnormal  104 18 150/84 106 96 %   11/16/23 22:23:06 97.4 °F (36.3 °C) Abnormal  103 16 152/84 107 96 %   11/16/23 19:48:18 97.6 °F (36.4 °C) 100 16 158/84 109 96 %   11/16/23 1845 -- 99 -- 150/80 110 94 %   11/16/23 1830 -- 100 -- 151/89 115 94 %   11/16/23 1800 -- 66 -- 152/85 112 94 %   11/16/23 1745 -- 99 20 156/90 118 94 %   11/16/23 1730 -- 98 -- 156/92 118 94 %   11/16/23 1715 -- 100 18 162/83 112 94 %   11/16/23 1655 97.7 °F (36.5 °C) 100 20 159/81 111 94 %   11/16/23 1640 98 °F (36.7 °C) 101 20 147/83 108 94 %   11/16/23 1620 97.8 °F (36.6 °C) 100 -- 146/83 -- 95 %   11/16/23 1430 -- 82 -- 147/79 102 96 %   11/16/23 1415 -- 93 -- 132/63 91 94 %   11/16/23 1400 -- 94 -- 146/74 103 91 %   11/16/23 1345 -- 67 -- 136/63 90 94 %   11/16/23 1330 -- 92 -- 133/73 98 95 %   11/16/23 1300 -- 94 -- 123/68 91 91 %   11/16/23 1245 -- 76 -- 123/58 84 92 %   11/16/23 1230 -- 79 -- 123/58 83 97 %   11/16/23 1215 -- 72 -- 116/56 79 92 %     ate and Time Eye Opening Best Verbal Response Best Motor Response Vani Coma Scale Score   11/17/23 0415 4 5 6 15   11/16/23 2327 4 5 6 15   11/16/23 1222 4 5 6 15         Pertinent Labs/Diagnostic Test Results:    11/16 ECG- Atrial fibrillation  Right bundle branch block  XR chest 1 view portable   Final Result by Eric Guillen MD (11/16 6366)      Changes above are consistent with pulmonary edema and small bilateral pleural effusions. The study was marked in [facility] for immediate notification.                   Workstation performed: [id number]           Results from last 7 days   Lab Units 11/16/23  2331   SARS-COV-2  Negative     Results from last 7 days   Lab Units 11/17/23  0954 11/17/23  0505 11/16/23  2342 11/16/23  1221   WBC Thousand/uL  --  8.27  --  7.10   HEMOGLOBIN g/dL 9.3* 9.4* 9.0* 7.5*   HEMATOCRIT %  --  28.5*  --  23.3*   PLATELETS Thousands/uL  --  206  --  184   NEUTROS ABS Thousands/µL  --   --   --  3.13         Results from last 7 days   Lab Units 11/17/23  0505 11/16/23  1221   SODIUM mmol/L 131* 129*   POTASSIUM mmol/L 4.4 4.9   CHLORIDE mmol/L 106 104   CO2 mmol/L 16* 17*   ANION GAP mmol/L 9 8   BUN mg/dL 34* 42*   CREATININE mg/dL 0.95 1.29   EGFR ml/min/1.73sq m 72 49   CALCIUM mg/dL 7.7* 7.5*     Results from last 7 days   Lab Units 11/17/23  0505 11/16/23  1221   AST U/L 16 14   ALT U/L 12 13   ALK PHOS U/L 301* 293*   TOTAL PROTEIN g/dL 5.8* 5.5*   ALBUMIN g/dL 3.2* 3.1*   TOTAL BILIRUBIN mg/dL 0.53 0.26     Results from last 7 days   Lab Units 11/17/23  1059 11/17/23  0711 11/16/23  2103   POC GLUCOSE mg/dl 128 108 196*     Results from last 7 days   Lab Units 11/17/23  0505 11/16/23  1221   GLUCOSE RANDOM mg/dL 106 129             No results found for: "BETA-HYDROXYBUTYRATE"       Results from last 7 days   Lab Units 11/16/23  1349   PH SHA  7.298*   PCO2 SHA mm Hg 36.8*   PO2 SHA mm Hg 33.2*   HCO3 SHA mmol/L 17.6*   BASE EXC SHA mmol/L -8.1   O2 CONTENT SHA ml/dL 7.0   O2 HGB, VENOUS % 55.7*             Results from last 7 days   Lab Units 11/16/23  1635 11/16/23  1353 11/16/23  1221   HS TNI 0HR ng/L  --   --  11   HS TNI 2HR ng/L  --  12  --    HSTNI D2 ng/L  --  1  --    HS TNI 4HR ng/L 15  --   --    HSTNI D4 ng/L 4  --   --                      Results from last 7 days   Lab Units 11/16/23  1440   LACTIC ACID mmol/L 0.4*                         Results from last 7 days   Lab Units 11/17/23  0547   UNIT PRODUCT CODE  [id number]   UNIT NUMBER  [id number]   UNITABO  A   UNITRH  POS   CROSSMATCH  Compatible   UNIT DISPENSE STATUS  Presumed Trans   UNIT PRODUCT VOL ml 350                         Results from last 7 days   Lab Units 11/16/23  1336   CLARITY UA  Turbid   COLOR UA  Yellow   SPEC GRAV UA 1.015   PH UA  6.0   GLUCOSE UA mg/dl Negative   KETONES UA mg/dl Negative   BLOOD UA  Small*   PROTEIN UA mg/dl 70 (1+)*   NITRITE UA  Negative   BILIRUBIN UA  Negative   UROBILINOGEN UA (BE) mg/dl <2.0   LEUKOCYTES UA  Large*   WBC UA /hpf Innumerable*   RBC UA /hpf 10-20*   BACTERIA UA /hpf Occasional   EPITHELIAL CELLS WET PREP /hpf None Seen   MUCUS THREADS  Occasional*     Results from last 7 days   Lab Units 11/16/23  2331   INFLUENZA A PCR  Negative   INFLUENZA B PCR  Negative   RSV PCR  Negative         ED Treatment:   Medication Administration from 11/16/2023 1204 to 11/16/2023 1944         Date/Time Order Dose Route Action     11/16/2023 1210 EST atropine (FOR EMS ONLY) 1 mg/10 mL injection 1 mg 0 mg Does not apply Given to EMS     11/16/2023 1520 EST calcium gluconate 1 g in sodium chloride 0.9% 50 mL (premix) 0 g Intravenous Stopped     11/16/2023 1450 EST calcium gluconate 1 g in sodium chloride 0.9% 50 mL (premix) 1 g Intravenous New Bag     11/16/2023 1514 EST ceftriaxone (ROCEPHIN) 1 g/50 mL in dextrose IVPB 0 mg Intravenous Stopped     11/16/2023 1442 EST ceftriaxone (ROCEPHIN) 1 g/50 mL in dextrose IVPB 1,000 mg Intravenous New Bag          No past medical history on file.   Present on Admission:   Anemia      Admitting Diagnosis: Weakness [R53.1]  Pleural effusion [J90]  Occult blood positive stool [R19.5]  Anemia requiring transfusions [D64.9]  Other fatigue [R53.83]  Age/Sex: 82 y.o. male  Admission Orders:  Scheduled Medications:  amLODIPine, 5 mg, Oral, Daily  bicalutamide, 50 mg, Oral, Daily  carvedilol, 25 mg, Oral, BID With Meals  docusate sodium, 100 mg, Oral, BID  ferrous sulfate, 325 mg, Oral, Daily With Breakfast  fluticasone, 2 spray, Nasal, Daily  insulin lispro, 1-6 Units, Subcutaneous, 4x Daily (AC & HS)  pantoprazole, 40 mg, Intravenous, Q12H MANOJ  pravastatin, 80 mg, Oral, Daily With Dinner  sodium bicarbonate, 650 mg, Oral, BID      Continuous IV Infusions:  sodium chloride, 75 mL/hr, Intravenous, Continuous      PRN Meds:       IP CONSULT TO GASTROENTEROLOGY  IP CONSULT TO CARDIOLOGY    Network Utilization Review Department  ATTENTION: Please call with any questions or concerns to [phone] and carefully listen to the prompts so that you are directed to the right person. All voicemails are confidential.   For Discharge needs, contact Care Management DC Support Team at [phone] opt. 2  Send all requests for admission clinical reviews, approved or denied determinations and any other requests to dedicated fax number below belonging to the campus where the patient is receiving treatment.  List of dedicated fax numbers for the Facilities:  [place] DENIALS (Administrative/Medical Necessity) [phone]   DISCHARGE SUPPORT TEAM (NETWORK) [id number] [facility] (Maternity/NICU/Pediatrics) [phone]   [address] [facility] [phone]   [address] [facility] [address] [address] [facility] [address] [facility] [id number] [place] [address] [phone]

## 2023-11-17 NOTE — ASSESSMENT & PLAN NOTE
Assessment:  Complains of generalized weakness and shortness of breath for the past 3 days  Also complains of heavy on/off chest pain for the past 3 days  POA Hgb 7.5  Was given 1 PRBC Unit in the ED  Patient Hgb 9.7 on 11/14 at [facility]  No dark stools  Heme occult test positive in the ED  Negative troponin  Normal lactic acid  Breathing on room air  November 17, 2023: Hemoglobin 9.4 this a.m. up from 7.5 on admission status post 1 unit packed red blood cells  GI will continue to follow tomorrow    Plan:   Hgb Q8H  Hold off home Eliquis  May resume regular diet  Transfuse if Hgb < 7

## 2023-11-17 NOTE — ASSESSMENT & PLAN NOTE
Assessment:  POA Sodium 129  Reviewing his chart, He had hyponatremia chronically  Sodium 131 on 11/17/2023    Plan:  Possibly due to elevated creatinine   Discontinued sodium chloride infusion

## 2023-11-17 NOTE — ASSESSMENT & PLAN NOTE
Lab Results   Component Value Date    HGBA1C 6.0 (H) 11/04/2023       Recent Labs     11/16/23 2103 11/17/23  0711   POCGLU 196* 108       Blood Sugar Average: Last 72 hrs:  (P) 152    Assessment:  On Januvia at home  Glucose level stable currently with an average of 152    Plan:  Placed on ISS   Carbohydrate controlled diet  Continue to monitor blood glucose levels  Will adjust glucose regiment as needed

## 2023-11-17 NOTE — CONSULTS
Consultation - [address]  [facility]  Vijay Bang 82 y.o. male MRN: [id number]  Unit/Bed#: S -01 Encounter: [id number]        Inpatient consult to gastroenterology  Consult performed by: Cristhian Rodríguez PA-C  Consult ordered by: Ivana Mcgowan MD          Reason for Consult / Principal Problem: Heme positive stool, anemia    ASSESSMENT and PLAN:    Principal Problem:    Generalized weakness  Active Problems:    Anemia    Acute on chronic heart failure (HCC)    Hyponatremia    Prostate cancer ([facility])    Diabetes ([facility])    Metabolic acidosis  Iron Deficiency Anemia second to chronic blood loss  Elevated Alkaline phosphatase  -Follow hemoglobin and transfuse as needed  -Hemoglobin 7.5 on admission currently 9.4 status post 1 unit packed red blood cells  -BUN slightly elevated. - continue to hold Eliquis (per patient last dose 11/15/23)  - Iron studies ordered and "in process"  - agree pantoprazole 40mg twice daily  - diet as tolerated  - elevated Alkphos likely second to metastatic disease to bone (extensive osseous mets noted on 10/31/23 CT)    No need for urgent endoscopy  Will follow over weekend to re-evaluate    -------------------------------------------------------------------------------------------------------------------    HPI: Is a 82-year-old male with past medical history of metastatic prostate cancer (to lymph and bone) status post cystoprostatectomy with ileal conduit done 8/2016, last radiation to bone mets 6/2023  currently on Casodex, Onc and Rad/Onc with LVH), iron deficiency anemia on oral iron, hypertension, diabetes, A-fib on Eliquis (last dose Wednesday 11/15/23 per patient) who presents with symptomatic anemia and generalized weakness shortness of breath and dyspnea on exertion. Denies black or bloody stools. Hemoglobin on admission was 7.5 and is currently 9.4 status post 1 unit of packed red blood cells.   BUN was slightly elevated at 42 on admission with normal creatinine at 1.29.  Today BUN remains slightly elevated at 34 and again normal creatinine at 0.95. He is noted to have isolated elevated alkaline phosphatase at 293, which is 301 today. He also has known extensive osseous metastasis. Vitals stable, satting 96% on room air. Endoscopic History:  EGD -August 2014 done for anemia with notation of hiatal hernia, GE junction erosion and gastritis. Gastric polyp also noted. Gastric biopsy negative for H. pylori. Gastric polyp consistent with fundic gland polyp  Colonoscopy -August 2014 done for anemia and history of colon polyps. 2 polyps removed and incidental diverticulosis and internal hemorrhoids noted. Sigmoid polyp tubulovillous adenoma and transverse colon polyp tubular adenoma on pathology    REVIEW OF SYSTEMS:    CONSTITUTIONAL: Denies any fever, chills, or rigors. Good appetite, and no recent weight loss. HEENT: No earache or tinnitus. Denies hearing loss or visual disturbances. CARDIOVASCULAR: No chest pain or palpitations. RESPIRATORY: Denies any cough, hemoptysis, shortness of breath or dyspnea on exertion. GASTROINTESTINAL: As noted in the History of Present Illness. GENITOURINARY: No problems with urination. Denies any hematuria or dysuria. NEUROLOGIC: No dizziness or vertigo, denies headaches. MUSCULOSKELETAL: Denies any muscle or joint pain. SKIN: Denies skin rashes or itching. ENDOCRINE: Denies excessive thirst. Denies intolerance to heat or cold. PSYCHOSOCIAL: Denies depression or anxiety. Denies any recent memory loss. Historical Information   No past medical history on file. No past surgical history on file. Social History   Social History     Substance and Sexual Activity   Alcohol Use Not on file     Social History     Substance and Sexual Activity   Drug Use Not on file     Social History     Tobacco Use   Smoking Status Not on file   Smokeless Tobacco Not on file     No family history on file.     Meds/Allergies Medications Prior to Admission   Medication    amLODIPine (NORVASC) 5 mg tablet    apixaban (ELIQUIS) 5 mg    bicalutamide (CASODEX) 50 mg tablet    carvedilol (COREG) 25 mg tablet    docusate sodium (COLACE) 100 mg capsule    fluticasone (FLONASE) 50 mcg/act nasal spray    omeprazole (PriLOSEC) 20 mg delayed release capsule    oral electrolytes (THERMOTABS) TABS    rosuvastatin (CRESTOR) 10 MG tablet    sitaGLIPtin (JANUVIA) 50 mg tablet    sodium bicarbonate 650 mg tablet    Cholecalciferol 25 MCG (1000 UT) capsule    ferrous sulfate 325 (65 Fe) mg tablet    multivitamin (THERAGRAN) TABS    OMEGA-3 FATTY ACIDS-VITAMIN E PO     Current Facility-Administered Medications   Medication Dose Route Frequency    amLODIPine (NORVASC) tablet 5 mg  5 mg Oral Daily    bicalutamide (CASODEX) tablet 50 mg  50 mg Oral Daily    carvedilol (COREG) tablet 25 mg  25 mg Oral BID With Meals    docusate sodium (COLACE) capsule 100 mg  100 mg Oral BID    ferrous sulfate tablet 325 mg  325 mg Oral Daily With Breakfast    fluticasone (FLONASE) 50 mcg/act nasal spray 2 spray  2 spray Nasal Daily    insulin lispro (HumaLOG) 100 units/mL subcutaneous injection 1-6 Units  1-6 Units Subcutaneous 4x Daily (AC & HS)    pantoprazole (PROTONIX) injection 40 mg  40 mg Intravenous Q12H MANOJ    pravastatin (PRAVACHOL) tablet 80 mg  80 mg Oral Daily With Dinner    sodium bicarbonate tablet 650 mg  650 mg Oral BID    sodium chloride 0.9 % infusion  75 mL/hr Intravenous Continuous       No Known Allergies        Objective     Blood pressure 150/84, pulse 104, temperature (!) 97.1 °F (36.2 °C), resp. rate 18, height 5' 9" (1.753 m), weight 78 kg (171 lb 15.3 oz), SpO2 96 %.       Intake/Output Summary (Last 24 hours) at 11/17/2023 0839  Last data filed at 11/16/2023 2327  Gross per 24 hour   Intake 630.83 ml   Output 775 ml   Net -144.17 ml         PHYSICAL EXAM:      General Appearance:   Alert, cooperative, no distress, appears stated age    HEENT: Normocephalic, atraumatic, sclera anicteric   Neck:  Supple, symmetrical   Lungs:   Clear to auscultation bilaterally; no rales, rhonchi or wheezing; respirations unlabored    Heart[de-identified]   S1 and S2 normal; regular rate and rhythm; no murmur, rub, or gallop. Abdomen:   Soft, non-tender, non-distended; normal bowel sounds; no masses, no organomegaly    Genitalia:   Deferred    Rectal:   Deferred    Extremities:  No cyanosis, clubbing or edema    Pulses:  2+ and symmetric all extremities    Skin:  Skin color, texture normal, no rashes or lesions    Lymph nodes:  Not assessed  Neuro: alert and oriented x 3  Psych: normal behavior       Lab Results:   Results from last 7 days   Lab Units 11/17/23  0505 11/16/23  2342 11/16/23  1221   WBC Thousand/uL 8.27  --  7.10   HEMOGLOBIN g/dL 9.4*   < > 7.5*   HEMATOCRIT % 28.5*  --  23.3*   PLATELETS Thousands/uL 206  --  184   NEUTROS PCT %  --   --  44   LYMPHS PCT %  --   --  41   MONOS PCT %  --   --  11   EOS PCT %  --   --  1    < > = values in this interval not displayed. Results from last 7 days   Lab Units 11/17/23  0505   POTASSIUM mmol/L 4.4   CHLORIDE mmol/L 106   CO2 mmol/L 16*   BUN mg/dL 34*   CREATININE mg/dL 0.95   CALCIUM mg/dL 7.7*   ALK PHOS U/L 301*   ALT U/L 12   AST U/L 16               Imaging Studies: I have personally reviewed pertinent imaging studies. XR chest 1 view portable    Result Date: 11/16/2023  Impression: Changes above are consistent with pulmonary edema and small bilateral pleural effusions. The study was marked in [facility] for immediate notification. Workstation performed: [id number]           Patient was seen and examined by Dr. Marcos Trevizo. All mendez medical decisions were made by Dr. Marcos Trevizo. Thank you for allowing us to participate in the care of this present patient. We will follow-up with you closely. Linda Echeverria PA-C

## 2023-11-17 NOTE — PHYSICAL THERAPY NOTE
PHYSICAL THERAPY EVALUATION  NAME:  Amber Anderson  DATE: 11/17/23    AGE:   82 y.o. Mrn:   [id number]  Principal problem: Principal Problem:    Generalized weakness  Active Problems:    Anemia    Acute on chronic heart failure (HCC)    Hyponatremia    Prostate cancer (HCC)    Diabetes (HCC)    Metabolic acidosis, NAG, bicarbonate losses      Vitals:    11/16/23 1948 11/16/23 2223 11/17/23 0600 11/17/23 0710   BP: 158/84 152/84  150/84   BP Location:  Right arm     Pulse: 100 103  104   Resp: 16 16 18   Temp: 97.6 °F (36.4 °C) (!) 97.4 °F (36.3 °C)  (!) 97.1 °F (36.2 °C)   TempSrc:  Oral     SpO2: 96% 96%  96%   Weight:   78 kg (171 lb 15.3 oz)    Height:           Length Of Stay: 1  Performed at least 2 patient identifiers during session: Name and ID bracelet  PHYSICAL THERAPY EVALUATION :    11/17/23 0835   PT Last Visit   PT Visit Date 11/17/23   Note Type   Note type Evaluation   Pain Assessment   Pain Assessment Tool 0-10   Pain Score No Pain   Restrictions/Precautions   Other Precautions Bed Alarm; Chair Alarm; Fall Risk;Hard of hearing   Home Living   Type of [address] to live on main level with bedroom/bathroom  (0 GABY)   Home Equipment   (reporrts most recently using a walker, otherwise wall walks at home)   Additional Comments informaiton above as per pt, but ? historian. Pt reports that he went to  for rehab, but was admitted from home. Reports doing PT/therex, but is not recalling any exercises   Prior Function   Level of [place] Independent with functional mobility; Independent with ADLs   Lives With Daughter  (who works during the day)   Falls in the last 6 months 1 to 4  (1-tripping, and per pt had patellar fx)   General   Additional Pertinent History 8/14/2023: Closed displaced comminuted fracture of right patella, initial encounter. Pt reports  being in knee brace, but was allowed to WB.  Reports being Out of knee brace for a few weeks   Family/Caregiver Present No Cognition   Overall Cognitive Status Impaired   Arousal/Participation Alert   Orientation Level Oriented to person   Memory Decreased recall of recent events;Decreased short term memory   Following Commands Follows one step commands with increased time or repetition  (Also a component of [place], pt denies wearing hearing aide)   Subjective   Subjective Upon first attempt, pt amb w/ walker to bathroom w/ PCA w/o physical A. Reports that he is still fearful of walking on his own and falling, however pt also impulsively stands from commode despite instruction to contact staff   RUE Assessment   RUE Assessment WFL   LUE Assessment   LUE Assessment WFL   RLE Assessment   RLE Assessment   (LE pitting edema at lower legs and feet)   Strength RLE   R Hip Flexion 4/5   R Knee Extension 4/5  (+crepitus vs "popping" @ R knee)   R Ankle Dorsiflexion 4/5   LLE Assessment   LLE Assessment   (LE pitting edema at lower legs and feet)   Strength LLE   L Hip Flexion 4/5   L Knee Extension 4/5   L Ankle Dorsiflexion 4/5   Vision-Basic Assessment   Current Vision   (reports no limited vision)   Light Touch   RLE Light Touch Impaired   RLE Light Touch Comments feet   LLE Light Touch Impaired   LLE Light Touch Comments feet   Bed Mobility   Supine to Sit Unable to assess   Sit to Supine 6  Modified independent   Transfers   Sit to Stand 6  Modified independent   Additional items Assist x 1; Increased time required;Armrests   Stand to Sit 6  Modified independent   Additional items Assist x 1; Increased time required;Armrests   Toilet transfer 5  Supervision   Additional items Assist x 1; Increased time required;Standard toilet;Verbal cues  (grab bar; verbal instruction for completion of approach)   Ambulation/Elevation   Gait pattern Excessively slow; Step through pattern  (forward flexed @ trunk/head)   Gait Assistance 5  Supervision   Additional items Assist x 1;Verbal cues   Assistive Device Rolling walker   Distance 20'   Stair Management Assistance Not tested   Balance   Static Sitting Good   Static Standing Fair +   Ambulatory Fair  (w/walker)   Endurance Deficit   Endurance Deficit Yes   Endurance Deficit Description (S)  Pt stating self reported fatigue despite Spo2 spot checks > 95% on RA. Inconsistent HR monitoring @ masimo however w/ activity--may benefit from more definitive HR monitoring? Activity Tolerance   Activity Tolerance Patient limited by fatigue   Medical Staff Made Aware spoke to resident briefly   Nurse Made Aware spoke to RN Jose Clarke before and after session     Assessment:   Pt is a 82 y.o. male seen for PT evaluation s/p admit to [facility] on 11/16/2023 w/ Generalized weakness. Order placed for PT. Prior to admission: Pt reportedly lived with daughter in a 1 floor environment with no steps to enter. Reports only recently using a rolling walker over the last several weeks, including since he had a patellar fracture. He also stated spending some time at [facility] for rehab. Upon evaluation: Pt required no physical assistance for bed mobility, transfers, nor during gait, does report fatigue with activity. Pt's clinical presentation is currently unstable/unpredictable given the functional mobility deficits above, especially (but not limited to) decreased functional mobility tolerance, and combined with medical complications including readmission to hospital, abnormal sodium values, and impulsivity during admission. Pt IS CLOSE to at his/her mobility baseline. He is at risk for falls based on hx of falls, impulsivity, impaired balance, limited sensation/neuropathy vs LE edema, and fear/retreat per pt. .       During this admission, pt would benefit from continued skilled inpatient PT in the acute care setting in order to address deficits as defined above to maximize function and mobility.       Recommendations:    From a PT standpoint, recommend next several sessions focus on progressive functional mobility training with closer monitoring of Spo2/HR if needed, progressing to LRAD as able, higher level balance activities. Prognosis Fair   Problem List Decreased endurance; Impaired balance;Decreased mobility; Decreased strength;Decreased cognition; Impaired judgement;Decreased safety awareness; Impaired hearing  (gait devaitions)   Barriers to Discharge Decreased caregiver support  (Pt reports daughter works)   Goals   Patient Goals to feel more confident that I won't fall when I get up   STG Expiration Date 11/27/23   Short Term Goal #1 Pt will: Perform rolling  and supine<>sit bed mobility tasks with modified I to prepare for transfers and reposition in bed. Perform transfers with modified I to promote proper hand placement and approach. Perform ambulation with LRAD for at least 48' with Supervision to increase Indep in home alone environment. Perform stairs as needed w/railing +/- DME and w/no more than min A to  be able to manage barriers of elevations in communuity . Perform TUG  w/ and w/o DME and improve baseline score to demonstrate less risk for falls. PT Treatment Day 1   Plan   Treatment/Interventions Functional transfer training; Therapeutic exercise; Endurance training;Cognitive reorientation;Patient/family training;Gait training;Bed mobility; Equipment eval/education;LE strengthening/ROM;Spoke to nursing;Spoke to MD   PT Frequency 3-5x/wk   Discharge Recommendation   Rehab Resource Intensity Level, PT III (Minimum Resource Intensity)   Equipment Recommended Walker   Additional Comments Nursing Recommendations:    Mobility Plan as of 11/17/23: Pt is one Assist with RW to/from recliner, BSC, and bathroom. Pt impulsive   Additional Comments 2 Co-morbidities affecting pt's physical performance at time of assessment include but are not limited to: metastic prostate cancer s/p cystoprostatectomy, hypertension, diabetes, hyperlipidemia, GERD, CHF, A-fib, history of CLL, and recent patellar fx now w/o brace. Personal factors affecting pt at time of IE include: limited home support, advanced age, past experience, behavioral pattern, inability to navigate community distances, limited insight into impairments, and recent fall(s), fear/retreat. AM-PAC Basic Mobility Inpatient   Turning in Flat Bed Without Bedrails 4   Lying on Back to Sitting on Edge of Flat Bed Without Bedrails 3   Moving Bed to Chair 3   Standing Up From Chair Using Arms 3   Walk in Room 3   Climb 3-5 Stairs With Railing 2   Basic Mobility Inpatient Raw Score 18   Basic Mobility Standardized Score 41.05   Highest Level Of Mobility   JH-HLM Goal 6: Walk 10 steps or more   JH-HLM Achieved 7: Walk 25 feet or more   Additional Treatment Session   Start Time 0850   End Time 0902   Treatment Assessment Pt was able to participate in additional mobility trials and balance activities w/ and w/o UE support. Pt was able to perform standing task w/ between 0-2 UE support with out uncorrected losses of balance. Additionallyhe continues to not need physical A for transfers nor gait, but reports fatigue by end of gait trial and declines further ambulation (although Spo2 >95%). Skilled PT recommended to progress pt toward treatment goals. Equipment Use rolling walker   Additional Treatment Day 1   Exercises   Neuro re-ed Pt was able to perform standing task w/ between 0-2 UE support with out uncorrected losses of balance. Additionally pt amb w/ rolling walker for another 20' S during treatment, but declined further ambulation trials or objective measures due to FATIGUE (although Spo2 >95%). End of Consult   Patient Position at End of Consult Supine; All needs within reach;Bed/Chair alarm activated  (Per Pt request)   (Please find full objective findings from PT assessment regarding body systems outlined above). The patient's AM-[facility] Basic Mobility Inpatient Short Form Raw Score is 18.  A Raw score of greater than 16 suggests the patient may benefit from discharge to home, which DOES coincide with CURRENT above PT recommendations pending mobility progress and interdisciplinary recommendations from OT/Cog. However please refer to therapist recommendation for discharge planning given other factors that may influence destination. Adapted from [employer] of AM-PAC “6-Clicks” Basic Mobility and Daily Activity Scores With Discharge Destination. Physical Therapy, 2021;101:1-9. DOI: [email or web address]    Portions of the record may have been created with voice recognition software. Occasional wrong word or "sound a like" substitutions may have occurred due to the inherent limitations of voice recognition software.   Read the chart carefully and recognize, using context, where substitutions have occurred

## 2023-11-17 NOTE — ASSESSMENT & PLAN NOTE
Assessment:  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  S/p Cystoprostatectomy  Have a urine ostomy   Patient is on Bicalutamide at home  UA- large leukocytes and small occult   Urine microscopy- RBC 10-20, WBC innumerable    Plan:  Continue home Bicalutamide   Given his Ostomy, UA and urine microscopy are not reliable  Pending Urine Cx

## 2023-11-17 NOTE — CONSULTS
Consultation - Cardiology Team 1  Jay Faulkner 82 y.o. male MRN: [id number]  Unit/Bed#: S -01 Encounter: [id number]    Assessment/Plan     Principal Problem:    Generalized weakness  Active Problems:    Anemia    Acute on chronic heart failure (HCC)    Hyponatremia    Prostate cancer (HCC)    Diabetes (HCC)    Metabolic acidosis, NAG, bicarbonate losses      Assessment/Plan    Shortness of breath  Associated with acute on chronic fatigue  Unable to complete ADL's independently ( change from a week ago)   Presented with acute on chronic anemia  CXR- pulmonary edema with small bilateral pleural effusions  S/P 1UPRBC's and IVF 75/hr - symptoms unchanged. H/H improved  Normal troponins  No associated chest pain  Will check limited echocardiogram today    CMP- suspected ischemic +/- other  Recovered LV function  Prior nuclear stress + infarct, no ischemia. See HPI  Will check limited echo  + elevated JVP, edema - reportedly stable  Reports 7 lb weight gain  Currently on no diuretic, pt states was stopped previously d/t hyponatremia    3. Metastatic prostate cancer  Pt reports recently started casodex  Follows with oncology at [facility]    4. Anemia- acute on chronic  Hgb 2 weeks ago 9.7- usual range   +FOBT. Eliquis held   Pt c/o constipation  GI consulted     5. Hyponatremia- history of SIADH  Poor PO intake  Receiving saline infusion 75/hr  Improved Na this am    6. Chronic atrial fibrillation- rate controlled. Eliquis on hold secondary to #4  ECG 8/2023- atrial fibrillation    7. 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 Renal insuffiency- baseline creat 0.8-1.0  Presented with creat 1.2- improve IVF/blood prodcuts     9. HTN-mildly hypertensive  PTA-amlodipine 5 mg daily, carvedilol 25 mg twice daily- has been continued.      History of Present Illness   Physician Requesting Consult: Brian Malhotra MD  Reason for Consult / Principal Problem aortic regurgitation and heart failure    HPI: Sophie Juarez is a 82y.o. year old male with RBBB, CLL, SIADH, metastatic prostate cancer status post radical cystoprostatectomy with ileal conduit , chronic atrial fibrillation, CVA ( prior imaging lacunar infarct) CMP with improved LV function,  HTN, left carotid stent, hyperlipidemia, type 2 diabetes , moderate to severe TR, moderate AS, moderate MR , chronic hyponatremia who presents with weakness and dyspnea x1 week. Prior to that he was able to perform ADL's without difficulty. Decreased oral intake X1 week. Weight gain 7 lbs 1 week. Prior notes indicate long standing fatigue. Daughter is concerned that there has been a recent change in cancer treatment and now pt with increasing fatigue and short of breath. He has been started on IVF. Improved     Chest x-ray-pulmonary edema. Small bilateral pleural effusions. Patient presented hyponatremic  0-hour troponin 11  2-hour troponin 12  4-hour troponin 15  Lactic acid 0.4  H/H- 7.5/23.3  VBG-pH 7.298 pH 7.298/PCO2 36.8/PO2 33.2/bicarb 17.6/ 55.7    He was hospitalized at [facility] 10/31-11/3 for acute cholecystitis. Patient was felt to be a very poor operative candidate. He was managed conservatively. If no improvement with antibiotic alone consider mami hull. Pt is followed by Dr. Aaron Brennan. LVEF 2016-30%. Suspect ischemic. Pharmacological Myoview stress test with fixed defect apical, inferoseptal, inferior wall likely due to infarcts. LVEF 2017-normalized. Most recent TTE 3/2023-LVEF 60 to 65%. Biatrial dilatation. Moderate MR. Moderate to severe TR. Moderate AS. Inpatient consult to Cardiology  Consult performed by: KOLBY Spencer  Consult ordered by: Stacia Vickers MD          Review of Systems   Constitutional:  Positive for fatigue. HENT: Negative. Eyes: Negative. Respiratory:  Positive for shortness of breath.     Cardiovascular:  Positive for leg swelling. Negative for chest pain and palpitations. Gastrointestinal:         Constipation   Endocrine: Negative. Genitourinary: Negative. Ren intact   Musculoskeletal:  Positive for gait problem. Allergic/Immunologic: Negative. Neurological:  Positive for weakness. Hematological:  Bruises/bleeds easily. Psychiatric/Behavioral: Negative. All other systems reviewed and are negative. Historical Information   No past medical history on file. No past surgical history on file. Social History     Substance and Sexual Activity   Alcohol Use Not on file     Social History     Substance and Sexual Activity   Drug Use Not on file     Social History     Tobacco Use   Smoking Status Not on file   Smokeless Tobacco Not on file     Family History: No family history on file.     Meds/Allergies   current meds:   Current Facility-Administered Medications   Medication Dose Route Frequency    amLODIPine (NORVASC) tablet 5 mg  5 mg Oral Daily    bicalutamide (CASODEX) tablet 50 mg  50 mg Oral Daily    carvedilol (COREG) tablet 25 mg  25 mg Oral BID With Meals    docusate sodium (COLACE) capsule 100 mg  100 mg Oral BID    ferrous sulfate tablet 325 mg  325 mg Oral Daily With Breakfast    fluticasone (FLONASE) 50 mcg/act nasal spray 2 spray  2 spray Nasal Daily    insulin lispro (HumaLOG) 100 units/mL subcutaneous injection 1-6 Units  1-6 Units Subcutaneous 4x Daily (AC & HS)    pantoprazole (PROTONIX) injection 40 mg  40 mg Intravenous Q12H MANOJ    pravastatin (PRAVACHOL) tablet 80 mg  80 mg Oral Daily With Dinner    sodium bicarbonate tablet 650 mg  650 mg Oral BID    sodium chloride 0.9 % infusion  75 mL/hr Intravenous Continuous    and PTA meds:    Medications Prior to Admission   Medication    amLODIPine (NORVASC) 5 mg tablet    apixaban (ELIQUIS) 5 mg    bicalutamide (CASODEX) 50 mg tablet    carvedilol (COREG) 25 mg tablet    docusate sodium (COLACE) 100 mg capsule    fluticasone (FLONASE) 50 mcg/act nasal spray    omeprazole (PriLOSEC) 20 mg delayed release capsule    oral electrolytes (THERMOTABS) TABS    rosuvastatin (CRESTOR) 10 MG tablet    sitaGLIPtin (JANUVIA) 50 mg tablet    sodium bicarbonate 650 mg tablet    Cholecalciferol 25 MCG (1000 UT) capsule    ferrous sulfate 325 (65 Fe) mg tablet    multivitamin (THERAGRAN) TABS    OMEGA-3 FATTY ACIDS-VITAMIN E PO     No Known Allergies    Objective   Vitals: Blood pressure 150/84, pulse 104, temperature (!) 97.1 °F (36.2 °C), resp. rate 18, height 5' 9" (1.753 m), weight 78 kg (171 lb 15.3 oz), SpO2 96 %.   Orthostatic Blood Pressures      Flowsheet Row Most Recent Value   Blood Pressure 150/84 filed at 11/17/2023 0710   Patient Position - Orthostatic VS Lying filed at 11/16/2023 2223              Intake/Output Summary (Last 24 hours) at 11/17/2023 0957  Last data filed at 11/16/2023 2327  Gross per 24 hour   Intake 630.83 ml   Output 775 ml   Net -144.17 ml       Invasive Devices       Peripheral Intravenous Line  Duration             Peripheral IV 11/16/23 Left;Proximal;Ventral (anterior) Forearm 1 day    Peripheral IV 11/16/23 Right Antecubital <1 day              Drain  Duration             Urostomy RLQ <1 day                    Physical Exam: /84   Pulse 104   Temp (!) 97.1 °F (36.2 °C)   Resp 18   Ht 5' 9" (1.753 m)   Wt 78 kg (171 lb 15.3 oz)   SpO2 96%   BMI 25.39 kg/m²   General Appearance:    Alert, cooperative, no distress, appears stated age   Head:    Normocephalic, no scleral icterus   Eyes:    PERRL   Nose:   Nares normal, septum midline, mucosa normal, no drainage    Throat:   Lips, mucosa, and tongue normal   Neck:   Supple, symmetrical, trachea midline     Elevated JVP       Lungs:     Clear t/decreased bases to auscultation bilaterally, respirations unlabored        Heart:    Irregular rate and rhythm, S1 and S2 normal, no murmur, rub   or gallop   Abdomen:     Soft, non-tender, bowel sounds active all four quadrants, no masses, no organomegaly   Extremities:   Extremities normal, atraumatic, no cyanosis mild lt leg and rt 1+ edema       Skin:   Skin warm   Neurologic:   Alert and oriented to person place and time.  No focal deficits       Lab Results:   Recent Results (from the past 72 hour(s))   ECG 12 lead    Collection Time: 11/16/23 12:17 PM   Result Value Ref Range    Ventricular Rate 78 BPM    Atrial Rate 76 BPM    SD Interval  ms    QRSD Interval 148 ms    QT Interval 432 ms    QTC Interval 492 ms    P Axis  degrees    QRS Axis 112 degrees    T Wave Axis 51 degrees   CBC and differential    Collection Time: 11/16/23 12:21 PM   Result Value Ref Range    WBC 7.10 4.31 - 10.16 Thousand/uL    RBC 2.40 (L) 3.88 - 5.62 Million/uL    Hemoglobin 7.5 (L) 12.0 - 17.0 g/dL    Hematocrit 23.3 (L) 36.5 - 49.3 %    MCV 97 82 - 98 fL    MCH 31.3 26.8 - 34.3 pg    MCHC 32.2 31.4 - 37.4 g/dL    RDW 15.5 (H) 11.6 - 15.1 %    MPV 10.1 8.9 - 12.7 fL    Platelets 682 872 - 424 Thousands/uL    nRBC 0 /100 WBCs    Neutrophils Relative 44 43 - 75 %    Immat GRANS % 2 0 - 2 %    Lymphocytes Relative 41 14 - 44 %    Monocytes Relative 11 4 - 12 %    Eosinophils Relative 1 0 - 6 %    Basophils Relative 1 0 - 1 %    Neutrophils Absolute 3.13 1.85 - 7.62 Thousands/µL    Immature Grans Absolute 0.15 0.00 - 0.20 Thousand/uL    Lymphocytes Absolute 2.89 0.60 - 4.47 Thousands/µL    Monocytes Absolute 0.79 0.17 - 1.22 Thousand/µL    Eosinophils Absolute 0.10 0.00 - 0.61 Thousand/µL    Basophils Absolute 0.04 0.00 - 0.10 Thousands/µL   Comprehensive metabolic panel    Collection Time: 11/16/23 12:21 PM   Result Value Ref Range    Sodium 129 (L) 135 - 147 mmol/L    Potassium 4.9 3.5 - 5.3 mmol/L    Chloride 104 96 - 108 mmol/L    CO2 17 (L) 21 - 32 mmol/L    ANION GAP 8 mmol/L    BUN 42 (H) 5 - 25 mg/dL    Creatinine 1.29 0.60 - 1.30 mg/dL    Glucose 129 65 - 140 mg/dL    Calcium 7.5 (L) 8.4 - 10.2 mg/dL    Corrected Calcium 8.2 (L) 8.3 - 10.1 mg/dL    AST 14 13 - 39 U/L    ALT 13 7 - 52 U/L    Alkaline Phosphatase 293 (H) 34 - 104 U/L    Total Protein 5.5 (L) 6.4 - 8.4 g/dL    Albumin 3.1 (L) 3.5 - 5.0 g/dL    Total Bilirubin 0.26 0.20 - 1.00 mg/dL    eGFR 49 ml/min/1.73sq m   HS Troponin 0hr (reflex protocol)    Collection Time: 11/16/23 12:21 PM   Result Value Ref Range    hs TnI 0hr 11 "Refer to ACS Flowchart"- see link ng/L   UA w Reflex to Microscopic w Reflex to Culture    Collection Time: 11/16/23  1:36 PM    Specimen: Urine, Right Nephrostomy   Result Value Ref Range    Color, UA Yellow     Clarity, UA Turbid     Specific Gravity, UA 1.015 1.003 - 1.030    pH, UA 6.0 4.5, 5.0, 5.5, 6.0, 6.5, 7.0, 7.5, 8.0    Leukocytes, UA Large (A) Negative    Nitrite, UA Negative Negative    Protein, UA 70 (1+) (A) Negative mg/dl    Glucose, UA Negative Negative mg/dl    Ketones, UA Negative Negative mg/dl    Urobilinogen, UA <2.0 <2.0 mg/dl mg/dl    Bilirubin, UA Negative Negative    Occult Blood, UA Small (A) Negative   Urine Microscopic    Collection Time: 11/16/23  1:36 PM   Result Value Ref Range    RBC, UA 10-20 (A) None Seen, 1-2 /hpf    WBC, UA Innumerable (A) None Seen, 1-2 /hpf    Epithelial Cells None Seen None Seen, Occasional /hpf    Bacteria, UA Occasional None Seen, Occasional /hpf    MUCUS THREADS Occasional (A) None Seen    Hyaline Casts, UA 0-3 (A) None Seen /lpf   Type and screen    Collection Time: 11/16/23  1:49 PM   Result Value Ref Range    ABO Grouping A     Rh Factor Positive     Antibody Screen Negative     Specimen Expiration Date 20231119    Blood gas, venous    Collection Time: 11/16/23  1:49 PM   Result Value Ref Range    pH, [place] 7.298 (L) 7.300 - 7.400    pCO2, [place] 36.8 (L) 42.0 - 50.0 mm Hg    pO2, [place] 33.2 (L) 35.0 - 45.0 mm Hg    HCO3, [place] 17.6 (L) 24 - 30 mmol/L    Base Excess, [place] -8.1 mmol/L    O2 Content, [place] 7.0 ml/dL    O2 HGB, VENOUS 55.7 (L) 60.0 - 80.0 %   HS Troponin I 2hr    Collection Time: 11/16/23  1:53 PM   Result Value Ref Range    hs TnI 2hr 12 "Refer to ACS Flowchart"- see link ng/L    Delta 2hr hsTnI 1 <20 ng/L   ABORh Recheck - Contact Blood Bank Prior to Collection    Collection Time: 11/16/23  2:40 PM   Result Value Ref Range    ABO Grouping A     Rh Factor Positive    Lactic acid, plasma (w/reflex if result > 2.0)    Collection Time: 11/16/23  2:40 PM   Result Value Ref Range    LACTIC ACID 0.4 (L) 0.5 - 2.0 mmol/L   HS Troponin I 4hr    Collection Time: 11/16/23  4:35 PM   Result Value Ref Range    hs TnI 4hr 15 "Refer to ACS Flowchart"- see link ng/L    Delta 4hr hsTnI 4 <20 ng/L   Fingerstick Glucose (POCT)    Collection Time: 11/16/23  9:03 PM   Result Value Ref Range    POC Glucose 196 (H) 65 - 140 mg/dl   FLU/RSV/COVID - if FLU/RSV clinically relevant    Collection Time: 11/16/23 11:31 PM    Specimen: Nasopharyngeal Swab; Nares   Result Value Ref Range    SARS-CoV-2 Negative Negative    INFLUENZA A PCR Negative Negative    INFLUENZA B PCR Negative Negative    RSV PCR Negative Negative   Hemoglobin    Collection Time: 11/16/23 11:42 PM   Result Value Ref Range    Hemoglobin 9.0 (L) 12.0 - 17.0 g/dL   Comprehensive metabolic panel    Collection Time: 11/17/23  5:05 AM   Result Value Ref Range    Sodium 131 (L) 135 - 147 mmol/L    Potassium 4.4 3.5 - 5.3 mmol/L    Chloride 106 96 - 108 mmol/L    CO2 16 (L) 21 - 32 mmol/L    ANION GAP 9 mmol/L    BUN 34 (H) 5 - 25 mg/dL    Creatinine 0.95 0.60 - 1.30 mg/dL    Glucose 106 65 - 140 mg/dL    Calcium 7.7 (L) 8.4 - 10.2 mg/dL    Corrected Calcium 8.3 8.3 - 10.1 mg/dL    AST 16 13 - 39 U/L    ALT 12 7 - 52 U/L    Alkaline Phosphatase 301 (H) 34 - 104 U/L    Total Protein 5.8 (L) 6.4 - 8.4 g/dL    Albumin 3.2 (L) 3.5 - 5.0 g/dL    Total Bilirubin 0.53 0.20 - 1.00 mg/dL    eGFR 72 ml/min/1.73sq m   CBC    Collection Time: 11/17/23  5:05 AM   Result Value Ref Range    WBC 8.27 4.31 - 10.16 Thousand/uL    RBC 3.03 (L) 3.88 - 5.62 Million/uL    Hemoglobin 9.4 (L) 12.0 - 17.0 g/dL Hematocrit 28.5 (L) 36.5 - 49.3 %    MCV 94 82 - 98 fL    MCH 31.0 26.8 - 34.3 pg    MCHC 33.0 31.4 - 37.4 g/dL    RDW 16.5 (H) 11.6 - 15.1 %    Platelets 333 575 - 423 Thousands/uL    MPV 10.2 8.9 - 12.7 fL   Prepare Leukoreduced RBC: 1 Units    Collection Time: 11/17/23  5:47 AM   Result Value Ref Range    Unit Product Code Q2886W95     Unit Number [id number]     Unit ABO A     Unit RH POS     Crossmatch Compatible     Unit Dispense Status Presumed Trans     Unit Product Volume 350 ml   Fingerstick Glucose (POCT)    Collection Time: 11/17/23  7:11 AM   Result Value Ref Range    POC Glucose 108 65 - 140 mg/dl     Imaging: I have personally reviewed pertinent reports. EKG: atrial fibrillation rbbb      Code Status: Level 1 - Full Code  Advance Directive and Living Will:      Power of :    POLST:      Counseling / Coordination of Care  Total floor / unit time spent today 60 minutes. Greater than 50% of total time was spent with the patient and / or family counseling and / or coordination of care.

## 2023-11-17 NOTE — ASSESSMENT & PLAN NOTE
Assessment:  Metabolic acidosis non anion gap on VBG  Albumin corrected anion gap was 11.0 mEq/L    Plan:  Continue home dose sodium bicarbonate 650 mg twice daily  Low threshold to start bicarbonate infusion

## 2023-11-17 NOTE — ASSESSMENT & PLAN NOTE
Assessment:  Complains of generalized weakness and shortness of breath for the past 3 days  Also complains of heavy on/off chest pain for the past 3 days  POA Hgb 7.5  Was given 1 PRBC Unit in the ED  Patient Hgb 9.7 on 11/14 at [facility]  No dark stools  Heme occult test positive in the ED  Negative troponin  Normal lactic acid  Breathing on room air    Plan:   Hgb Q8H  Consult GI for possible GI bleed  Hold off home Eliquis  Transfuse if Hgb < 7

## 2023-11-17 NOTE — PLAN OF CARE
Problem: PHYSICAL THERAPY ADULT  Goal: Performs mobility at highest level of function for planned discharge setting. See evaluation for individualized goals. Description: Treatment/Interventions: Functional transfer training, Therapeutic exercise, Endurance training, Cognitive reorientation, Patient/family training, Gait training, Bed mobility, Equipment eval/education, LE strengthening/ROM, Spoke to nursing, Spoke to MD  Equipment Recommended: Belkis Gibson       See flowsheet documentation for full assessment, interventions and recommendations. 11/17/2023 1039 by Waqar Red PT  Note: Prognosis: Fair  Problem List: Decreased endurance, Impaired balance, Decreased mobility, Decreased strength, Decreased cognition, Impaired judgement, Decreased safety awareness, Impaired hearing (gait devaitions)  Assessment: Pt is a 82 y.o. male seen for PT evaluation s/p admit to [facility] on 11/16/2023 w/ Generalized weakness. Order placed for PT. Prior to admission: Pt reportedly lived with daughter in a 1 floor environment with no steps to enter. Reports only recently using a rolling walker over the last several weeks, including since he had a patellar fracture. He also stated spending some time at [facility] for rehab. Upon evaluation: Pt required no physical assistance for bed mobility, transfers, nor during gait, does report fatigue with activity. Pt's clinical presentation is currently unstable/unpredictable given the functional mobility deficits above, especially (but not limited to) decreased functional mobility tolerance, and combined with medical complications including readmission to hospital, abnormal sodium values, and impulsivity during admission. Pt IS CLOSE to at his/her mobility baseline. He is at risk for falls based on hx of falls, impulsivity, impaired balance, limited sensation/neuropathy vs LE edema, and fear/retreat per pt. .       During this admission, pt would benefit from continued skilled inpatient PT in the acute care setting in order to address deficits as defined above to maximize function and mobility. Recommendations:     From a PT standpoint, recommend next several sessions focus on progressive functional mobility training with closer monitoring of Spo2/HR if needed, progressing to LRAD as able, higher level balance activities. Barriers to Discharge: Decreased caregiver support (Pt reports daughter works)     Rehab Resource Intensity Level, PT: III (Minimum Resource Intensity)    See flowsheet documentation for full assessment.

## 2023-11-17 NOTE — ASSESSMENT & PLAN NOTE
Wt Readings from Last 3 Encounters:   11/16/23 78.4 kg (172 lb 13.5 oz)   07/29/14 82.6 kg (182 lb)     Assessment:  According to chart, have a history of CHF  Last Echocardiogram on 3/2023: EF 60-65%  Patient does not look volume overloaded on exam, but has bilateral 1+ edema   Chest Xray: Pulmonary edema and small bilateral effusions   Patient is not on any diuretic at home     Plan:  Is & Os  Daily Weights   Hold off diuresis for now

## 2023-11-17 NOTE — PLAN OF CARE
Problem: INFECTION - ADULT  Goal: Absence or prevention of progression during hospitalization  Description: INTERVENTIONS:  - Assess and monitor for signs and symptoms of infection  - Monitor lab/diagnostic results  - Monitor all insertion sites, i.e. indwelling lines, tubes, and drains  - Monitor endotracheal if appropriate and nasal secretions for changes in amount and color  - Kremlin appropriate cooling/warming therapies per order  - Administer medications as ordered  - Instruct and encourage patient and family to use good hand hygiene technique  - Identify and instruct in appropriate isolation precautions for identified infection/condition  Outcome: Progressing     Problem: PAIN - ADULT  Goal: Verbalizes/displays adequate comfort level or baseline comfort level  Description: Interventions:  - Encourage patient to monitor pain and request assistance  - Assess pain using appropriate pain scale  - Administer analgesics based on type and severity of pain and evaluate response  - Implement non-pharmacological measures as appropriate and evaluate response  - Consider cultural and social influences on pain and pain management  - Notify physician/advanced practitioner if interventions unsuccessful or patient reports new pain  Outcome: Progressing     Problem: SAFETY ADULT  Goal: Maintain or return to baseline ADL function  Description: INTERVENTIONS:  -  Assess patient's ability to carry out ADLs; assess patient's baseline for ADL function and identify physical deficits which impact ability to perform ADLs (bathing, care of mouth/teeth, toileting, grooming, dressing, etc.)  - Assess/evaluate cause of self-care deficits   - Assess range of motion  - Assess patient's mobility; develop plan if impaired  - Assess patient's need for assistive devices and provide as appropriate  - Encourage maximum independence but intervene and supervise when necessary  - Involve family in performance of ADLs  - Assess for home care needs following discharge   - Consider OT consult to assist with ADL evaluation and planning for discharge  - Provide patient education as appropriate  Outcome: Progressing     Problem: HEMATOLOGIC - ADULT  Goal: Maintains hematologic stability  Description: INTERVENTIONS  - Assess for signs and symptoms of bleeding or hemorrhage  - Monitor labs  - Administer supportive blood products/factors as ordered and appropriate  Outcome: Progressing

## 2023-11-17 NOTE — ASSESSMENT & PLAN NOTE
Assessment:  Metabolic acidosis on VBG    Plan:  Continue home dose sodium bicarbonate 650 mg twice daily

## 2023-11-17 NOTE — ASSESSMENT & PLAN NOTE
Wt Readings from Last 3 Encounters:   11/17/23 78 kg (171 lb 15.3 oz)   07/29/14 82.6 kg (182 lb)     Assessment:  According to chart, have a history of CHF  Last Echocardiogram on 3/2023: EF 60-65%  Patient does not look volume overloaded on exam, but has bilateral 1+ edema   Chest Xray: Pulmonary edema and small bilateral effusions   Patient is not on any diuretic at home     Plan:  Is & Os  Daily Weights   Hold off diuresis for now   Cardiology consulted  Echo ordered

## 2023-11-17 NOTE — ASSESSMENT & PLAN NOTE
Assessment:  Complains of generalized weakness for the past 3 days not being able to get out of bed or dress himself. Also complains of feeling lightheaded when standing up   Flu/RSV/COVID negative  Uses a walker at baseline  11/17/2023: Patient still feels weak despite 1 unit packed red blood cells and improving hemoglobin levels  GI is following and will continue to evaluate  PT recommends more sessions to focus on progressive functional mobility training with closer monitoring of SPO2/heart rate.    Spoke with daughter who initially wanted to transfer patient to [facility] for continuity of care purposes however they reconsidered this and have now canceled initiation of transfer    Plan  OT consult ordered  Ambulate as tolerated

## 2023-11-17 NOTE — PLAN OF CARE
Problem: Potential for Falls  Goal: Patient will remain free of falls  Description: INTERVENTIONS:  - Educate patient/family on patient safety including physical limitations  - Instruct patient to call for assistance with activity   - Consult OT/PT to assist with strengthening/mobility   - Keep Call bell within reach  - Keep bed low and locked with side rails adjusted as appropriate  - Keep care items and personal belongings within reach  - Initiate and maintain comfort rounds  - Make Fall Risk Sign visible to staff  - Offer Toileting every 3-4 Hours, in advance of need  - Initiate/Maintain bed alarm, comfort rounds  - Obtain necessary fall risk management equipment: alarms, walker, non-slip yellow socks  - Apply yellow socks and bracelet for high fall risk patients  - Consider moving patient to room near nurses station  Outcome: Progressing     Problem: SAFETY ADULT  Goal: Maintain or return to baseline ADL function  Description: INTERVENTIONS:  -  Assess patient's ability to carry out ADLs; assess patient's baseline for ADL function and identify physical deficits which impact ability to perform ADLs (bathing, care of mouth/teeth, toileting, grooming, dressing, etc.)  - Assess/evaluate cause of self-care deficits   - Assess range of motion  - Assess patient's mobility; develop plan if impaired  - Assess patient's need for assistive devices and provide as appropriate  - Encourage maximum independence but intervene and supervise when necessary  - Involve family in performance of ADLs  - Assess for home care needs following discharge   - Consider OT consult to assist with ADL evaluation and planning for discharge  - Provide patient education as appropriate  Outcome: Progressing     Problem: HEMATOLOGIC - ADULT  Goal: Maintains hematologic stability  Description: INTERVENTIONS  - Assess for signs and symptoms of bleeding or hemorrhage  - Monitor labs  - Administer supportive blood products/factors as ordered and HPI:  Patient is a 80 year-old male PMH HTN and HLD on ASA-81 who is presenting for admission for T11-T12 schwannoma resection. Patient claims he has been having severe lower back pain that radiates around to his anterior abdomen since about March. Patient claims pain is worse when sitting and even worse when lying down. He was seen and examined at bedside in ED. Pt standing up for most of examination secondary to LBP while sitting. He is following all commands and endorses LBP with radiation around abdomen. Denies headaches, weakness, paresthesias, saddle anesthesia, incontinence, chest pain, or SOB. s/p thoracic laminectomy for resection of schwannoma on 8/10      PAST MEDICAL & SURGICAL HISTORY:  HTN (hypertension)    HLD (hyperlipidemia)        Hospital Course:    TODAY'S SUBJECTIVE & REVIEW OF SYMPTOMS:     Constitutional WNL   Cardio WNL   Resp WNL   GI WNL  Heme WNL  Endo WNL  Skin WNL  MSK pain  Neuro WNL  Cognitive WNL  Psych WNL      MEDICATIONS  (STANDING):  atorvastatin 80 milliGRAM(s) Oral at bedtime  ceFAZolin   IVPB 1000 milliGRAM(s) IV Intermittent every 8 hours  dexAMETHasone  Injectable 4 milliGRAM(s) IV Push every 6 hours  HYDROmorphone PCA (1 mG/mL) 30 milliLiter(s) PCA Continuous PCA Continuous  lactated ringers. 1000 milliLiter(s) (75 mL/Hr) IV Continuous <Continuous>  methocarbamol 750 milliGRAM(s) Oral every 6 hours  metoprolol tartrate 12.5 milliGRAM(s) Oral every 12 hours  multivitamin 1 Tablet(s) Oral daily  pantoprazole    Tablet 40 milliGRAM(s) Oral before breakfast  sodium chloride 0.9%. 1000 milliLiter(s) (70 mL/Hr) IV Continuous <Continuous>    MEDICATIONS  (PRN):  acetaminophen   Tablet .. 650 milliGRAM(s) Oral every 6 hours PRN Temp greater or equal to 38C (100.4F), Mild Pain (1 - 3)  ondansetron Injectable 4 milliGRAM(s) IV Push every 6 hours PRN Nausea and/or Vomiting  senna 2 Tablet(s) Oral at bedtime PRN Constipation      FAMILY HISTORY:      Allergies    No Known Allergies    Intolerances        SOCIAL HISTORY:    [  ] Etoh  [  ] Smoking  [  ] Substance abuse     Home Environment:  [   ] Home Alone  [ x  ] Lives with Family  [   ] Home Health Aid    Dwelling:  [   ] Apartment  [  x ] Private House  [   ] Adult Home  [   ] Skilled Nursing Facility      [   ] Short Term  [   ] Long Term  [  x ] Stairs       Elevator [   ]    FUNCTIONAL STATUS PTA: (Check all that apply)  Ambulation: [ x   ]Independent    [   ] Dependent     [   ] Non-Ambulatory  Assistive Device: [ x  ] SA Cane  [   ]  Q Cane  [   ] Walker  [   ]  Wheelchair  ADL : [x   ] Independent  [    ]  Dependent       Vital Signs Last 24 Hrs  T(C): 36.2 (10 Aug 2021 15:54), Max: 36.8 (09 Aug 2021 21:54)  T(F): 97.2 (10 Aug 2021 15:54), Max: 98.2 (09 Aug 2021 21:54)  HR: 51 (10 Aug 2021 16:24) (50 - 99)  BP: 117/60 (10 Aug 2021 16:24) (106/57 - 156/72)  BP(mean): --  RR: 12 (10 Aug 2021 16:24) (12 - 20)  SpO2: 99% (10 Aug 2021 16:24) (97% - 100%)      PHYSICAL EXAM: Awake & Alert  GENERAL: NAD  HEAD:  Normocephalic  CHEST/LUNG: Clear   HEART: S1S2+  ABDOMEN: Soft, Nontender  EXTREMITIES:  no calf tenderness    NERVOUS SYSTEM:  Cranial Nerves 2-12 intact [   ] Abnormal  [   ]  ROM: WFL all extremities [  x ]  Abnormal [   ]  Motor Strength: WFL all extremities  [ x  ]  Abnormal [   ]  Sensation: intact to light touch [ x  ] Abnormal [   ]    FUNCTIONAL STATUS:  Bed Mobility: Independent [   ]  Supervision [   ]  Needs Assistance [  x ]  N/A [   ]  Transfers: Independent [   ]  Supervision [   ]  Needs Assistance [  x ]  N/A [   ]   Ambulation: Independent [   ]  Supervision [   ]  Needs Assistance [   ]  N/A [   ]  ADL: Independent [   ] Requires Assistance [   ] N/A [   ]      LABS:                        13.8   7.36  )-----------( 207      ( 09 Aug 2021 18:58 )             41.2     08-09    138  |  101  |  21<H>  ----------------------------<  97  4.9   |  27  |  0.8    Ca    9.1      09 Aug 2021 19:55    TPro  6.6  /  Alb  4.7  /  TBili  1.4<H>  /  DBili  x   /  AST  23  /  ALT  16  /  AlkPhos  51  08-09    PT/INR - ( 09 Aug 2021 19:55 )   PT: 11.60 sec;   INR: 1.01 ratio         PTT - ( 09 Aug 2021 19:55 )  PTT:27.9 sec      RADIOLOGY & ADDITIONAL STUDIES:   appropriate  Outcome: Progressing

## 2023-11-17 NOTE — ASSESSMENT & PLAN NOTE
Assessment:  POA Sodium 129  Reviewing his chart, He had hyponatremia chronically    Plan:  Possibly due to elevated creatinine   Hydration 75 ml/hr IV NS

## 2023-11-17 NOTE — H&P
[id number]  H&P  Name: Raf Newby 82 y.o. male I MRN: [id number]  Unit/Bed#: S -01 I Date of Admission: 11/16/2023   Date of Service: 11/16/2023 I Hospital Day: 0      Assessment/Plan   * Generalized weakness  Assessment & Plan  Assessment:  Complains of generalized weakness for the past 3 days not being able to get out of bed or dress himself.    Also complains of feeling lightheaded when standing up   Uses a walker at baseline      Plan  PT/OT  Ambulate as tolerated     Anemia  Assessment & Plan  Assessment:  Complains of generalized weakness and shortness of breath for the past 3 days  Also complains of heavy on/off chest pain for the past 3 days  POA Hgb 7.5  Was given 1 PRBC Unit in the ED  Patient Hgb 9.7 on 11/14 at [facility]  No dark stools  Heme occult test positive in the ED  Negative troponin  Normal lactic acid  Breathing on room air    Plan:   Hgb Q8H  Consult GI for possible GI bleed  Hold off home Eliquis  Transfuse if Hgb < 7    Acute on chronic heart failure (HCC)  Assessment & Plan  Wt Readings from Last 3 Encounters:   11/16/23 78.4 kg (172 lb 13.5 oz)   07/29/14 82.6 kg (182 lb)     Assessment:  According to chart, have a history of CHF  Last Echocardiogram on 3/2023: EF 60-65%  Patient does not look volume overloaded on exam, but has bilateral 1+ edema   Chest Xray: Pulmonary edema and small bilateral effusions   Patient is not on any diuretic at home     Plan:  Is & Os  Daily Weights   Hold off diuresis for now       Metabolic acidosis  Assessment & Plan  Assessment:  Metabolic acidosis on VBG    Plan:  Continue home dose sodium bicarbonate 650 mg twice daily    Diabetes [facility])  Assessment & Plan  Lab Results   Component Value Date    HGBA1C 6.0 (H) 11/04/2023       Recent Labs     11/16/23 2103   POCGLU 196*       Blood Sugar Average: Last 72 hrs:  (P) 196    Assessment:  On Januvia at home    Plan:  Placed on ISS       Prostate cancer 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  S/p Cystoprostatectomy  Have a urine ostomy   Patient is on Bicalutamide at home  UA- large leukocytes and small occult   Urine microscopy- RBC 10-20, WBC innumerable    Plan:  Continue home Bicalutamide   Given his Ostomy, UA and urine microscopy are not reliable  Pending Urine Cx      Hyponatremia  Assessment & Plan  Assessment:  POA Sodium 129  Reviewing his chart, He had hyponatremia chronically    Plan:  Possibly due to elevated creatinine   Hydration 75 ml/hr IV NS          VTE Pharmacologic Prophylaxis: VTE Score: 9 High Risk (Score >/= 5) - Pharmacological DVT Prophylaxis Contraindicated. Sequential Compression Devices Ordered. Code Status: Level 1 - Full Code   Discussion with family: Updated  (daughter) at bedside. Anticipated Length of Stay: Patient will be admitted on an inpatient basis with an anticipated length of stay of greater than 2 midnights secondary to Anemia and suspicion of internal bleeding . Chief Complaint: Generalized weakness and shortness of breath    History of Present Illness:  Farida Donovan is a 82 y.o. male with a PMH of metastic prostate cancer s/p cystoprostatectomy, hypertension, diabetes, hyperlipidemia, GERD, CHF, A-fib, history of CLL Who presents with generalized weakness and shortness of breath for the past 3 days. Reports lack of energy for the past 3 days to the point that he could not get out of bed or dress himself. Patient feels afraid of getting out of bed because he feels lightheaded that he may fall down. He also reports shortness of breath associated with dry cough and heavy nonpleuritic nonpositional on/off chest pain. Complains of decreased appetite. No abdominal pain, nausea, vomiting, or dark stools. Alternating constipation and diarrhea. No back pain or headache. Patient lives in assisted living and follow-up with [facility].     Review of Systems:  Review of Systems Constitutional:  Positive for appetite change and fatigue. Negative for chills and fever. HENT:  Negative for congestion and sore throat. Respiratory:  Positive for cough and shortness of breath. Negative for choking, chest tightness and wheezing. Cardiovascular:  Positive for chest pain. Negative for palpitations. Gastrointestinal:  Negative for abdominal pain, blood in stool, constipation, diarrhea, nausea and vomiting. Neurological:  Positive for weakness and light-headedness. Negative for dizziness, syncope and headaches. Psychiatric/Behavioral:  Negative for confusion and hallucinations. Past Medical and Surgical History:   No past medical history on file. No past surgical history on file. Meds/Allergies:  Prior to Admission medications    Medication Sig Start Date End Date Taking?  Authorizing Provider   amLODIPine (NORVASC) 5 mg tablet Take 1 tablet by mouth daily 9/13/23  Yes Historical Provider, MD   apixaban (ELIQUIS) 5 mg Take 5 mg by mouth 2 (two) times a day   Yes Historical Provider, MD   bicalutamide (CASODEX) 50 mg tablet Take 50 mg by mouth daily 10/9/23 10/8/24 Yes Historical Provider, MD   carvedilol (COREG) 25 mg tablet Take 25 mg by mouth 5/24/23  Yes Historical Provider, MD   docusate sodium (COLACE) 100 mg capsule Take 100 mg by mouth 2 (two) times a day   Yes Historical Provider, MD   fluticasone (FLONASE) 50 mcg/act nasal spray 2 sprays into each nostril daily   Yes Historical Provider, MD   omeprazole (PriLOSEC) 20 mg delayed release capsule Take 1 capsule by mouth 2 (two) times a day 9/4/14  Yes Historical Provider, MD   oral electrolytes (THERMOTABS) TABS Take 2 tablets by mouth daily 9/27/23  Yes Historical Provider, MD   rosuvastatin (CRESTOR) 10 MG tablet Take 1 tablet by mouth daily 6/21/23  Yes Historical Provider, MD   sitaGLIPtin (JANUVIA) 50 mg tablet Take 50 mg by mouth daily   Yes Historical Provider, MD   sodium bicarbonate 650 mg tablet Take 650 mg by mouth 2 (two) times a day 11/16/23 11/20/23 Yes Historical Provider, MD   lisinopril (ZESTRIL) 20 mg tablet Take 20 mg by mouth daily 11/15/23 11/16/23 Yes Historical Provider, MD   magnesium citrate (CITROMA) 1.745 g/30 mL oral solution Take by mouth 7/29/14 11/16/23 Yes Historical Provider, MD   PEG 3350-KCl-NaBcb-NaCl-NaSulf (Golytely) 227.1 g PACK Take by mouth 7/29/14 11/16/23 Yes Historical Provider, MD   Cholecalciferol 25 MCG (1000 UT) capsule Take 1,000 Units by mouth daily    Historical Provider, MD   ferrous sulfate 325 (65 Fe) mg tablet Take 1 tablet by mouth daily with breakfast    Historical Provider, MD   multivitamin (THERAGRAN) TABS Take 1 tablet by mouth daily    Historical Provider, MD   OMEGA-3 FATTY ACIDS-VITAMIN E PO Take 1 tablet by mouth daily    Historical Provider, MD   alfuzosin (UROXATRAL) 10 mg 24 hr tablet Take by mouth  11/16/23  Historical Provider, MD   Aspirin 81 MG CAPS Take by mouth  11/16/23  Historical Provider, MD   Calcium Carbonate-Vit D-Min (Calcium 1200) 9253-7559 MG-UNIT CHEW Chew  11/16/23  Historical Provider, MD   hydrochlorothiazide (HYDRODIURIL) 25 mg tablet Take by mouth  11/16/23  Historical Provider, MD   metFORMIN (GLUCOPHAGE) 500 mg tablet Take by mouth  11/16/23  Historical Provider, MD   potassium chloride (Klor-Con) 20 mEq packet Take by mouth  11/16/23  Historical Provider, MD   ramipril (ALTACE) 10 MG capsule Take by mouth  11/16/23  Historical Provider, MD   verapamil (VERELAN) 240 MG 24 hr capsule Take by mouth  11/16/23  Historical Provider, MD     I have reviewed home medications with patient personally.     Allergies: No Known Allergies    Social History:  Marital Status:    Occupation: Retired   Patient Pre-hospital Living Situation: Assisted Living  Patient Pre-hospital Level of Mobility: walks with walker  Patient Pre-hospital Diet Restrictions: None  Substance Use History:   Social History     Substance and Sexual Activity   Alcohol Use Not on file     Social History     Tobacco Use   Smoking Status Not on file   Smokeless Tobacco Not on file     Social History     Substance and Sexual Activity   Drug Use Not on file       Family History:  No family history on file. Physical Exam:     Vitals:   Blood Pressure: 158/84 (11/16/23 1948)  Pulse: 100 (11/16/23 1948)  Temperature: 97.6 °F (36.4 °C) (11/16/23 1948)  Temp Source: Oral (11/16/23 1655)  Respirations: 16 (11/16/23 1948)  Height: 5' 9" (175.3 cm) (11/16/23 1212)  Weight - Scale: 78 kg (171 lb 15.3 oz) (11/16/23 1935)  SpO2: 96 % (11/16/23 1948)    Physical Exam  Constitutional:       Appearance: Normal appearance. HENT:      Head: Normocephalic and atraumatic. Mouth/Throat:      Mouth: Mucous membranes are dry. Eyes:      Extraocular Movements: Extraocular movements intact. Cardiovascular:      Rate and Rhythm: Normal rate and regular rhythm. Pulses: Normal pulses. Heart sounds: Normal heart sounds. Pulmonary:      Breath sounds: Examination of the right-lower field reveals decreased breath sounds. Decreased breath sounds present. Abdominal:      General: Abdomen is flat. Tenderness: There is no abdominal tenderness. Musculoskeletal:      Cervical back: Normal range of motion. Right lower leg: Edema present. Left lower leg: Edema present. Comments: +1   Neurological:      Mental Status: He is alert and oriented to person, place, and time.      Patient have a urine Ostomy; Urine color is yellow and clear on Exam     Additional Data:     Lab Results:  Results from last 7 days   Lab Units 11/16/23  1221   WBC Thousand/uL 7.10   HEMOGLOBIN g/dL 7.5*   HEMATOCRIT % 23.3*   PLATELETS Thousands/uL 184   NEUTROS PCT % 44   LYMPHS PCT % 41   MONOS PCT % 11   EOS PCT % 1     Results from last 7 days   Lab Units 11/16/23  1221   SODIUM mmol/L 129*   POTASSIUM mmol/L 4.9   CHLORIDE mmol/L 104   CO2 mmol/L 17*   BUN mg/dL 42*   CREATININE mg/dL 1.29   ANION GAP mmol/L 8   CALCIUM mg/dL 7.5*   ALBUMIN g/dL 3.1*   TOTAL BILIRUBIN mg/dL 0.26   ALK PHOS U/L 293*   ALT U/L 13   AST U/L 14   GLUCOSE RANDOM mg/dL 129         Results from last 7 days   Lab Units 11/16/23  2103   POC GLUCOSE mg/dl 196*         Results from last 7 days   Lab Units 11/16/23  1440   LACTIC ACID mmol/L 0.4*       Lines/Drains:  Invasive Devices       Peripheral Intravenous Line  Duration             Peripheral IV 11/16/23 Left;Proximal;Ventral (anterior) Forearm <1 day    Peripheral IV 11/16/23 Right Antecubital <1 day                        Imaging: Reviewed radiology reports from this admission including: chest xray  XR chest 1 view portable   Final Result by Velvet Mendez MD (11/16 6736)      Changes above are consistent with pulmonary edema and small bilateral pleural effusions. The study was marked in [facility] for immediate notification. Workstation performed: [id number]             EKG and Other Studies Reviewed on Admission:   EKG: Atrial fibrillation. HR 78.    ** Please Note: This note has been constructed using a voice recognition system.  **

## 2023-11-17 NOTE — ASSESSMENT & PLAN NOTE
Lab Results   Component Value Date    HGBA1C 6.0 (H) 11/04/2023       Recent Labs     11/16/23 2103   POCGLU 196*       Blood Sugar Average: Last 72 hrs:  (P) 196    Assessment:  On Januvia at home    Plan:  Placed on ISS

## 2023-11-17 NOTE — PROGRESS NOTES
[facility]  Progress Note  Name: Uma Allen  MRN: [id number]  Unit/Bed#: S -32 I Date of Admission: 11/16/2023   Date of Service: 11/17/2023 I Hospital Day: 1    Assessment/Plan   * Generalized weakness  Assessment & Plan  Assessment:  Complains of generalized weakness for the past 3 days not being able to get out of bed or dress himself. Also complains of feeling lightheaded when standing up   Flu/RSV/COVID negative  Uses a walker at baseline  11/17/2023: Patient still feels weak despite 1 unit packed red blood cells and improving hemoglobin levels  GI is following and will continue to evaluate  PT recommends more sessions to focus on progressive functional mobility training with closer monitoring of SPO2/heart rate.    Spoke with daughter who initially wanted to transfer patient to [facility] for continuity of care purposes however they reconsidered this and have now canceled initiation of transfer    Plan  OT consult ordered  Ambulate as tolerated     Metabolic acidosis, NAG, bicarbonate losses  Assessment & Plan  Assessment:  Metabolic acidosis non anion gap on VBG  Albumin corrected anion gap was 11.0 mEq/L    Plan:  Continue home dose sodium bicarbonate 650 mg twice daily  Low threshold to start bicarbonate infusion    Diabetes [facility])  Assessment & Plan  Lab Results   Component Value Date    HGBA1C 6.0 (H) 11/04/2023       Recent Labs     11/16/23 2103 11/17/23  0711   POCGLU 196* 108       Blood Sugar Average: Last 72 hrs:  (P) 152    Assessment:  On Januvia at home  Glucose level stable currently with an average of 152    Plan:  Placed on ISS   Carbohydrate controlled diet  Continue to monitor blood glucose levels  Will adjust glucose regiment as needed      Prostate cancer [facility])  Assessment & Plan  Assessment:  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  S/p Cystoprostatectomy  Have a urine ostomy   Patient is on Bicalutamide at home  UA- large leukocytes and small occult   Urine microscopy- RBC 10-20, WBC innumerable    Plan:  Continue home Bicalutamide   Given his Ostomy, UA and urine microscopy are not reliable  Pending Urine Cx      Hyponatremia  Assessment & Plan  Assessment:  POA Sodium 129  Reviewing his chart, He had hyponatremia chronically  Sodium 131 on 11/17/2023    Plan:  Possibly due to elevated creatinine   Discontinued sodium chloride infusion    Acute on chronic heart failure (HCC)  Assessment & Plan  Wt Readings from Last 3 Encounters:   11/17/23 78 kg (171 lb 15.3 oz)   07/29/14 82.6 kg (182 lb)     Assessment:  According to chart, have a history of CHF  Last Echocardiogram on 3/2023: EF 60-65%  Patient does not look volume overloaded on exam, but has bilateral 1+ edema   Chest Xray: Pulmonary edema and small bilateral effusions   Patient is not on any diuretic at home     Plan:  Is & Os  Daily Weights   Hold off diuresis for now   Cardiology consulted  Echo ordered      Anemia  Assessment & Plan  Assessment:  Complains of generalized weakness and shortness of breath for the past 3 days  Also complains of heavy on/off chest pain for the past 3 days  POA Hgb 7.5  Was given 1 PRBC Unit in the ED  Patient Hgb 9.7 on 11/14 at [facility]  No dark stools  Heme occult test positive in the ED  Negative troponin  Normal lactic acid  Breathing on room air  November 17, 2023: Hemoglobin 9.4 this a.m. up from 7.5 on admission status post 1 unit packed red blood cells  GI will continue to follow tomorrow    Plan:   Hgb Q8H  Hold off home Eliquis  May resume regular diet  Transfuse if Hgb < 7           VTE Pharmacologic Prophylaxis: VTE Score: 9 High Risk (Score >/= 5) - Pharmacological DVT Prophylaxis Contraindicated. Sequential Compression Devices Ordered.     Mobility:   Basic Mobility Inpatient Raw Score: 18  JH-HLM Goal: 6: Walk 10 steps or more  JH-HLM Achieved: 7: Walk 25 feet or more      Patient Centered Rounds: I performed bedside rounds with nursing staff today.  Discussions with Specialists or Other Care Team Provider: None    Education and Discussions with Family / Patient: Attempted to update  (daughter) via phone. Unable to contact. Current Length of Stay: 1 day(s)  Current Patient Status: Inpatient   Discharge Plan: Anticipate discharge later today or tomorrow to discharge location to be determined pending rehab evaluations. Code Status: Level 1 - Full Code    Subjective:   No acute events overnight. Patient states he is feeling overall well and that he does have some energy back after receiving the blood transfusion. Had no acute complaints. Expressed frustration that he was brought to [facility] rather than [facility] where he has all his paperwork. Objective:     Vitals:   Temp (24hrs), Av.6 °F (36.4 °C), Min:97.1 °F (36.2 °C), Max:98 °F (36.7 °C)    Temp:  [97.1 °F (36.2 °C)-98 °F (36.7 °C)] 97.1 °F (36.2 °C)  HR:  [] 104  Resp:  [16-20] 18  BP: (146-162)/(80-92) 150/84  SpO2:  [94 %-96 %] 96 %  Body mass index is 25.39 kg/m². Input and Output Summary (last 24 hours): Intake/Output Summary (Last 24 hours) at 2023 1445  Last data filed at 2023 1001  Gross per 24 hour   Intake 630.83 ml   Output 1425 ml   Net -794.17 ml       Physical Exam:   Physical Exam  Constitutional:       General: He is not in acute distress. Appearance: Normal appearance. He is not ill-appearing or toxic-appearing. HENT:      Head: Normocephalic and atraumatic. Mouth/Throat:      Mouth: Mucous membranes are moist.      Pharynx: Oropharynx is clear. Eyes:      Extraocular Movements: Extraocular movements intact. Conjunctiva/sclera: Conjunctivae normal.   Neck:      Comments: Elevated JVD noted  Cardiovascular:      Rate and Rhythm: Regular rhythm. Tachycardia present. Pulses: Normal pulses. Heart sounds: Normal heart sounds.    Pulmonary:      Effort: Pulmonary effort is normal.      Breath sounds: Normal breath sounds. No wheezing, rhonchi or rales. Abdominal:      General: Abdomen is flat. Tenderness: There is no abdominal tenderness. There is no guarding or rebound. Musculoskeletal:      Cervical back: Normal range of motion. Right lower leg: Edema present. Left lower leg: Edema present. Comments: Trace edema in the bilateral lower extremities   Skin:     General: Skin is warm. Capillary Refill: Capillary refill takes less than 2 seconds. Findings: No lesion or rash. Neurological:      Mental Status: He is alert and oriented to person, place, and time. Psychiatric:         Mood and Affect: Mood normal.         Behavior: Behavior normal.          Additional Data:     Labs:  Results from last 7 days   Lab Units 11/17/23  0954 11/17/23  0505 11/16/23  2342 11/16/23  1221   WBC Thousand/uL  --  8.27  --  7.10   HEMOGLOBIN g/dL 9.3* 9.4*   < > 7.5*   HEMATOCRIT %  --  28.5*  --  23.3*   PLATELETS Thousands/uL  --  206  --  184   NEUTROS PCT %  --   --   --  44   LYMPHS PCT %  --   --   --  41   MONOS PCT %  --   --   --  11   EOS PCT %  --   --   --  1    < > = values in this interval not displayed.      Results from last 7 days   Lab Units 11/17/23  0505   SODIUM mmol/L 131*   POTASSIUM mmol/L 4.4   CHLORIDE mmol/L 106   CO2 mmol/L 16*   BUN mg/dL 34*   CREATININE mg/dL 0.95   ANION GAP mmol/L 9   CALCIUM mg/dL 7.7*   ALBUMIN g/dL 3.2*   TOTAL BILIRUBIN mg/dL 0.53   ALK PHOS U/L 301*   ALT U/L 12   AST U/L 16   GLUCOSE RANDOM mg/dL 106         Results from last 7 days   Lab Units 11/17/23  1059 11/17/23  0711 11/16/23  2103   POC GLUCOSE mg/dl 128 108 196*         Results from last 7 days   Lab Units 11/16/23  1440   LACTIC ACID mmol/L 0.4*       Lines/Drains:  Invasive Devices       Peripheral Intravenous Line  Duration             Peripheral IV 11/16/23 Right Antecubital <1 day              Drain  Duration             Urostomy RLQ <1 day                          Imaging: Reviewed radiology reports from this admission including: chest xray    Recent Cultures (last 7 days):         Last 24 Hours Medication List:   Current Facility-Administered Medications   Medication Dose Route Frequency Provider Last Rate    amLODIPine  5 mg Oral Daily Will Bejarano MD      bicalutamide  50 mg Oral Daily Will Bejarano MD      carvedilol  25 mg Oral BID With Meals Will Bejarano MD      docusate sodium  100 mg Oral BID Will Bejarano MD      ferrous sulfate  325 mg Oral Daily With Collette Andrade MD      fluticasone  2 spray Nasal Daily Will Bejarano MD      insulin lispro  1-6 Units Subcutaneous 4x Daily (AC & HS) Jason Erickson MD      pantoprazole  40 mg Intravenous Q12H 300 [facility], MD      pravastatin  80 mg Oral Daily With Helio Ashley MD      sodium bicarbonate  650 mg Oral BID Will Bejarano MD          Today, Patient Was Seen By: Nisha Hector MD    **Please Note: This note may have been constructed using a voice recognition system. **

## 2023-11-17 NOTE — ASSESSMENT & PLAN NOTE
Assessment:  Complains of generalized weakness for the past 3 days not being able to get out of bed or dress himself.    Also complains of feeling lightheaded when standing up   Uses a walker at baseline      Plan  PT/OT  Ambulate as tolerated

## 2023-11-18 ENCOUNTER — APPOINTMENT (INPATIENT)
Dept: NON INVASIVE DIAGNOSTICS | Facility: HOSPITAL | Age: 88
End: 2023-11-18
Payer: COMMERCIAL

## 2023-11-18 LAB
ANION GAP SERPL CALCULATED.3IONS-SCNC: 8 MMOL/L
AORTIC ROOT: 4.1 CM
AORTIC VALVE MEAN VELOCITY: 14 M/S
APICAL FOUR CHAMBER EJECTION FRACTION: 64 %
ASCENDING AORTA: 3.8 CM
AV AREA BY CONTINUOUS VTI: 0.9 CM2
AV AREA PEAK VELOCITY: 0.9 CM2
AV LVOT MEAN GRADIENT: 1 MMHG
AV LVOT PEAK GRADIENT: 1 MMHG
AV MEAN GRADIENT: 9 MMHG
AV PEAK GRADIENT: 15 MMHG
AV VALVE AREA: 0.95 CM2
AV VELOCITY RATIO: 0.26
BACTERIA UR CULT: ABNORMAL
BACTERIA UR CULT: ABNORMAL
BUN SERPL-MCNC: 29 MG/DL (ref 5–25)
CALCIUM SERPL-MCNC: 7.3 MG/DL (ref 8.4–10.2)
CHLORIDE SERPL-SCNC: 106 MMOL/L (ref 96–108)
CO2 SERPL-SCNC: 17 MMOL/L (ref 21–32)
CREAT SERPL-MCNC: 0.88 MG/DL (ref 0.6–1.3)
DOP CALC AO PEAK VEL: 1.95 M/S
DOP CALC AO VTI: 50.32 CM
DOP CALC LVOT AREA: 3.8 CM2
DOP CALC LVOT CARDIAC INDEX: 1.19 L/MIN/M2
DOP CALC LVOT CARDIAC OUTPUT: 2.26 L/MIN
DOP CALC LVOT DIAMETER: 2.2 CM
DOP CALC LVOT PEAK VEL VTI: 12.55 CM
DOP CALC LVOT PEAK VEL: 0.51 M/S
DOP CALC LVOT STROKE INDEX: 23.2 ML/M2
DOP CALC LVOT STROKE VOLUME: 47.68 CM3
ERYTHROCYTE [DISTWIDTH] IN BLOOD BY AUTOMATED COUNT: 16.6 % (ref 11.6–15.1)
FRACTIONAL SHORTENING: 32 (ref 28–44)
GFR SERPL CREATININE-BSD FRML MDRD: 77 ML/MIN/1.73SQ M
GLUCOSE SERPL-MCNC: 101 MG/DL (ref 65–140)
GLUCOSE SERPL-MCNC: 101 MG/DL (ref 65–140)
GLUCOSE SERPL-MCNC: 132 MG/DL (ref 65–140)
GLUCOSE SERPL-MCNC: 133 MG/DL (ref 65–140)
GLUCOSE SERPL-MCNC: 134 MG/DL (ref 65–140)
HCT VFR BLD AUTO: 29.1 % (ref 36.5–49.3)
HGB BLD-MCNC: 9.2 G/DL (ref 12–17)
INTERVENTRICULAR SEPTUM IN DIASTOLE (PARASTERNAL SHORT AXIS VIEW): 1.3 CM
INTERVENTRICULAR SEPTUM: 1.3 CM (ref 0.6–1.1)
LAAS-AP2: 33.5 CM2
LAAS-AP4: 26.2 CM2
LEFT ATRIUM SIZE: 4.2 CM
LEFT ATRIUM VOLUME (MOD BIPLANE): 110 ML
LEFT ATRIUM VOLUME INDEX (MOD BIPLANE): 57.9 ML/M2
LEFT INTERNAL DIMENSION IN SYSTOLE: 3.2 CM (ref 2.1–4)
LEFT VENTRICULAR INTERNAL DIMENSION IN DIASTOLE: 4.7 CM (ref 3.5–6)
LEFT VENTRICULAR POSTERIOR WALL IN END DIASTOLE: 1.2 CM
LEFT VENTRICULAR STROKE VOLUME: 60 ML
LVSV (TEICH): 60 ML
MCH RBC QN AUTO: 30.1 PG (ref 26.8–34.3)
MCHC RBC AUTO-ENTMCNC: 31.6 G/DL (ref 31.4–37.4)
MCV RBC AUTO: 95 FL (ref 82–98)
MV E'TISSUE VEL-SEP: 8 CM/S
PLATELET # BLD AUTO: 187 THOUSANDS/UL (ref 149–390)
PMV BLD AUTO: 9.8 FL (ref 8.9–12.7)
POTASSIUM SERPL-SCNC: 4.5 MMOL/L (ref 3.5–5.3)
RA PRESSURE ESTIMATED: 8 MMHG
RBC # BLD AUTO: 3.06 MILLION/UL (ref 3.88–5.62)
RIGHT ATRIAL 2D VOLUME: 116 ML
RIGHT ATRIUM AREA SYSTOLE A4C: 32.1 CM2
RIGHT VENTRICLE ID DIMENSION: 4.3 CM
RV PSP: 52 MMHG
SL CV LEFT ATRIUM LENGTH A2C: 7.3 CM
SL CV LV EF: 55
SL CV PED ECHO LEFT VENTRICLE DIASTOLIC VOLUME (MOD BIPLANE) 2D: 101 ML
SL CV PED ECHO LEFT VENTRICLE SYSTOLIC VOLUME (MOD BIPLANE) 2D: 41 ML
SODIUM SERPL-SCNC: 131 MMOL/L (ref 135–147)
TR MAX PG: 44 MMHG
TR PEAK VELOCITY: 3.3 M/S
TRICUSPID ANNULAR PLANE SYSTOLIC EXCURSION: 1.7 CM
TRICUSPID VALVE PEAK REGURGITATION VELOCITY: 3.32 M/S
WBC # BLD AUTO: 8.49 THOUSAND/UL (ref 4.31–10.16)

## 2023-11-18 PROCEDURE — 82948 REAGENT STRIP/BLOOD GLUCOSE: CPT

## 2023-11-18 PROCEDURE — C9113 INJ PANTOPRAZOLE SODIUM, VIA: HCPCS | Performed by: HOSPITALIST

## 2023-11-18 PROCEDURE — 93325 DOPPLER ECHO COLOR FLOW MAPG: CPT | Performed by: INTERNAL MEDICINE

## 2023-11-18 PROCEDURE — 80048 BASIC METABOLIC PNL TOTAL CA: CPT

## 2023-11-18 PROCEDURE — 85027 COMPLETE CBC AUTOMATED: CPT

## 2023-11-18 PROCEDURE — 93308 TTE F-UP OR LMTD: CPT | Performed by: INTERNAL MEDICINE

## 2023-11-18 PROCEDURE — 99232 SBSQ HOSP IP/OBS MODERATE 35: CPT | Performed by: INTERNAL MEDICINE

## 2023-11-18 PROCEDURE — 93325 DOPPLER ECHO COLOR FLOW MAPG: CPT

## 2023-11-18 PROCEDURE — 93308 TTE F-UP OR LMTD: CPT

## 2023-11-18 PROCEDURE — 93321 DOPPLER ECHO F-UP/LMTD STD: CPT

## 2023-11-18 PROCEDURE — 93321 DOPPLER ECHO F-UP/LMTD STD: CPT | Performed by: INTERNAL MEDICINE

## 2023-11-18 RX ORDER — SODIUM BICARBONATE 650 MG/1
650 TABLET ORAL
Status: DISCONTINUED | OUTPATIENT
Start: 2023-11-18 | End: 2023-11-21 | Stop reason: HOSPADM

## 2023-11-18 RX ADMIN — BENZONATATE 200 MG: 100 CAPSULE ORAL at 08:18

## 2023-11-18 RX ADMIN — SODIUM BICARBONATE 650 MG TABLET 650 MG: at 13:10

## 2023-11-18 RX ADMIN — DOCUSATE SODIUM 100 MG: 100 CAPSULE, LIQUID FILLED ORAL at 08:19

## 2023-11-18 RX ADMIN — AMLODIPINE BESYLATE 5 MG: 5 TABLET ORAL at 08:18

## 2023-11-18 RX ADMIN — BICALUTAMIDE 50 MG: 50 TABLET, FILM COATED ORAL at 08:20

## 2023-11-18 RX ADMIN — CARVEDILOL 25 MG: 12.5 TABLET, FILM COATED ORAL at 08:19

## 2023-11-18 RX ADMIN — SODIUM BICARBONATE 650 MG TABLET 650 MG: at 08:18

## 2023-11-18 RX ADMIN — PANTOPRAZOLE SODIUM 40 MG: 40 INJECTION, POWDER, FOR SOLUTION INTRAVENOUS at 08:18

## 2023-11-18 RX ADMIN — PANTOPRAZOLE SODIUM 40 MG: 40 INJECTION, POWDER, FOR SOLUTION INTRAVENOUS at 20:32

## 2023-11-18 RX ADMIN — DOCUSATE SODIUM 100 MG: 100 CAPSULE, LIQUID FILLED ORAL at 17:11

## 2023-11-18 RX ADMIN — BENZONATATE 200 MG: 100 CAPSULE ORAL at 16:53

## 2023-11-18 RX ADMIN — PRAVASTATIN SODIUM 80 MG: 80 TABLET ORAL at 16:52

## 2023-11-18 RX ADMIN — CARVEDILOL 25 MG: 12.5 TABLET, FILM COATED ORAL at 16:53

## 2023-11-18 RX ADMIN — FERROUS SULFATE TAB 325 MG (65 MG ELEMENTAL FE) 325 MG: 325 (65 FE) TAB at 08:19

## 2023-11-18 RX ADMIN — SODIUM BICARBONATE 650 MG TABLET 650 MG: at 16:53

## 2023-11-18 RX ADMIN — FLUTICASONE PROPIONATE 2 SPRAY: 50 SPRAY, METERED NASAL at 08:29

## 2023-11-18 RX ADMIN — BENZONATATE 200 MG: 100 CAPSULE ORAL at 20:36

## 2023-11-18 NOTE — ASSESSMENT & PLAN NOTE
Assessment:  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     Plan:  Continue home Bicalutamide   Given his Ostomy, UA and urine microscopy are not reliable  Monitor off antibiotics

## 2023-11-18 NOTE — ASSESSMENT & PLAN NOTE
Assessment:  Complains of generalized weakness for the past 3 days not being able to get out of bed or dress himself. Also complains of feeling lightheaded when standing up   Uses a walker at baseline  GI is following and will continue to evaluate  PT recommends more sessions to focus on progressive functional mobility training with closer monitoring of SPO2/heart rate. Reports he has been walking with walker from bed to bathroom, however feeling weak.     Plan  PT/OT consult   Ambulate as tolerated

## 2023-11-18 NOTE — UTILIZATION REVIEW
Date:  11/18/2023  Day 3: Has surpassed a 2nd midnight with active treatments and services, which include ongoing labs, vitals, IV medication, assessments, ongoing DX test- ECHO per Cardiology consult, GI consult for management. Inpatient admission 11/16/2023 Acute on chronic anemia, hyponatremia w HX diastolic CHF, DM2, Metabolic acidosis.   See initial review completed by Lilli Mabry RN  on 11/17/2023

## 2023-11-18 NOTE — ASSESSMENT & PLAN NOTE
Lab Results   Component Value Date    HGBA1C 6.0 (H) 11/04/2023     Recent Labs     11/17/23  1059 11/17/23  1603 11/17/23  2043 11/18/23  0640   POCGLU 128 158* 108 101   Blood Sugar Average: Last 72 hrs:  (P) [id number]    Assessment:  On Januvia at home  Glucose level stable currently with an average of 152    Plan:  Placed on ISS   Carbohydrate controlled diet  Continue to monitor blood glucose levels  Will adjust glucose regiment as needed

## 2023-11-18 NOTE — ASSESSMENT & PLAN NOTE
Wt Readings from Last 3 Encounters:   11/18/23 74.8 kg (164 lb 14.5 oz)   07/29/14 82.6 kg (182 lb)     Assessment:  According to chart, have a history of CHF  Last Echocardiogram on 3/2023: EF 60-65%  Patient does not look volume overloaded on exam, but has bilateral 1+ edema   Chest Xray: Pulmonary edema and small bilateral effusions   Patient is not on any diuretic at home-has been stopped due to hyponatremia  Has crackles at right base,+ JVD    Plan:  Is & Os  Daily Weights   Hold off diuresis for now   Cardiology consulted  Echo pending - for decision on volume management

## 2023-11-18 NOTE — ASSESSMENT & PLAN NOTE
Assessment:  Complains of generalized weakness and shortness of breath for the past 3 days  Also complains of heavy on/off chest pain for the past 3 days  POA Hgb 7.5  Was given 1 PRBC Unit in the ED  Patient Hgb 9.7 on 11/14 at [facility]  No dark stools  Heme occult test positive in the ED  normal lactic acid  Breathing on room air  Hemoglobin stable 9.2, from 9.4. Denies bloody bowel movements, did have a BM this morning.     Plan:   Daily CBC  Hold off home Eliquis in setting of possible GI bleed  GI following, possible EGD on Monday  Continue regular diet  Transfuse if Hgb < 7

## 2023-11-18 NOTE — PROGRESS NOTES
Cardiology Progress Note   Chiqui Sutton 82 y.o. male MRN: [id number]    Unit/Bed#: S -01 Encounter: [id number]      Assessment/Plan  Shortness of breath  Associated with acute on chronic fatigue  Unable to complete ADL's independently ( change from a week ago)   Presented with acute on chronic anemia  CXR- pulmonary edema with small bilateral pleural effusions  S/P 1UPRBC's and IVF 75/hr x~24 hours   No associated chest pain  Will check limited echocardiogram today     CMP- suspected ischemic +/- other  Recovered LV function  Prior nuclear stress + infarct, no ischemia. See HPI  Will check limited echo  +elevated JVP, edema - reportedly stable  Reports 7 lb weight gain  Currently on no diuretic, was stopped previously d/t  worsening hyponatremia     3. Metastatic prostate cancer  Pt reports recently started casodex  Follows with oncology at [facility]     4. Anemia- acute on chronic  Hgb 2 weeks ago 9.7- usual range   +FOBT. Eliquis held   Pt c/o constipation  GI consulted      5. Hyponatremia- history of SIADH  Poor PO intake  Receiving saline infusion 75/hr  Improved Na this am     6. Chronic atrial fibrillation- rate controlled. Eliquis on hold secondary to #4  ECG 8/2023- atrial fibrillation     7. 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 Renal insuffiency- baseline creat 0.8-1.0  Presented with creat 1.2- improve IVF/blood prodcuts      9. HTN-mildly hypertensive  PTA-amlodipine 5 mg daily, carvedilol 25 mg twice daily- has been continued. Plan/Discussion:   Check TTE today to determine filling pressures. Diuretics based on TTE results. Highly recommend <1.5L fluid restriction daily to help with hyponatremia-- diet adjusted. Hopefully we can reduce PO sodium requirement in the future to prevent overall fluid retention. Continue Coreg, Norvasc, statin. Subjective:   Patient seen and examined. Overnight events reviewed.  Patient denies any acute complaints at this time. Objective:     Vitals: Blood pressure 151/92, pulse (!) 106, temperature 97.6 °F (36.4 °C), temperature source Oral, resp. rate 18, height 5' 9" (1.753 m), weight 74.8 kg (164 lb 14.5 oz), SpO2 93 %. , Body mass index is 24.35 kg/m².,   Orthostatic Blood Pressures      Flowsheet Row Most Recent Value   Blood Pressure 151/92 filed at 11/18/2023 0740   Patient Position - Orthostatic VS Sitting filed at 11/18/2023 0740              Intake/Output Summary (Last 24 hours) at 11/18/2023 0950  Last data filed at 11/18/2023 0701  Gross per 24 hour   Intake --   Output 1350 ml   Net -1350 ml         Physical Exam:    GEN: Lauri Ramírez appears well, alert and oriented x 3, pleasant and cooperative   HEENT: Sclera anicteric, conjunctivae pink, mucous membranes moist. Oropharynx clear. NECK: supple, no significant JVD, Trachea midline, no thyromegaly. HEART: regular rhythm, normal S1 and S2, no murmurs, clicks, gallops or rubs   LUNGS: clear to auscultation bilaterally; no wheezes, rales, or rhonchi. No increased work of breathing or signs of respiratory distress. ABDOMEN: Soft, nontender, nondistended  EXTREMITIES: Skin warm and well perfused, no clubbing, cyanosis, or edema. NEURO: No focal findings. Normal speech. Mood and affect normal.   SKIN: Normal without suspicious lesions on exposed skin.       Medications:      Current Facility-Administered Medications:     amLODIPine (NORVASC) tablet 5 mg, 5 mg, Oral, Daily, Madeline Choi MD, 5 mg at 11/18/23 0818    benzonatate (TESSALON PERLES) capsule 200 mg, 200 mg, Oral, TID, Adalberto Delgado MD, 200 mg at 11/18/23 0818    bicalutamide (CASODEX) tablet 50 mg, 50 mg, Oral, Daily, Madeline Choi MD, 50 mg at 11/18/23 0820    carvedilol (COREG) tablet 25 mg, 25 mg, Oral, BID With Meals, Madeline Choi MD, 25 mg at 11/18/23 0819    docusate sodium (COLACE) capsule 100 mg, 100 mg, Oral, BID, Madeline Choi MD, 100 mg at 11/18/23 0819    ferrous sulfate tablet 325 mg, 325 mg, Oral, Daily With Breakfast, Preston Topete MD, 325 mg at 11/18/23 0819    fluticasone (FLONASE) 50 mcg/act nasal spray 2 spray, 2 spray, Nasal, Daily, Preston Topete MD, 2 spray at 11/18/23 0829    insulin lispro (HumaLOG) 100 units/mL subcutaneous injection 1-6 Units, 1-6 Units, Subcutaneous, 4x Daily (AC & HS), 2 Units at 11/16/23 2327 **AND** Fingerstick Glucose (POCT), , , 4x Daily AC and at bedtime, Abhinav Huynh MD    pantoprazole (PROTONIX) injection 40 mg, 40 mg, Intravenous, Q12H [address], Guillermo Baker MD, 40 mg at 11/18/23 0818    pravastatin (PRAVACHOL) tablet 80 mg, 80 mg, Oral, Daily With Samir Rudd MD, 80 mg at 11/17/23 1754    sodium bicarbonate tablet 650 mg, 650 mg, Oral, TID after meals, Abhinav Huynh MD     Labs & Results:        Results from last 7 days   Lab Units 11/18/23  0514 11/17/23  1805 11/17/23  0954 11/17/23  0505 11/16/23  2342 11/16/23  1221   WBC Thousand/uL 8.49  --   --  8.27  --  7.10   HEMOGLOBIN g/dL 9.2* 9.0* 9.3* 9.4*   < > 7.5*   HEMATOCRIT % 29.1*  --   --  28.5*  --  23.3*   PLATELETS Thousands/uL 187  --   --  206  --  184    < > = values in this interval not displayed.          Results from last 7 days   Lab Units 11/18/23  0514 11/17/23  0505 11/16/23  1221   POTASSIUM mmol/L 4.5 4.4 4.9   CHLORIDE mmol/L 106 106 104   CO2 mmol/L 17* 16* 17*   BUN mg/dL 29* 34* 42*   CREATININE mg/dL 0.88 0.95 1.29   CALCIUM mg/dL 7.3* 7.7* 7.5*   ALK PHOS U/L  --  301* 293*   ALT U/L  --  12 13   AST U/L  --  16 14

## 2023-11-18 NOTE — ASSESSMENT & PLAN NOTE
Assessment:  POA Sodium 129  Reviewing his chart, He had hyponatremia chronically  Sodium 131 stable  Examines overloaded    Plan:  Pending echo, may need diuresis  A.m.  BMP  Hold IVF

## 2023-11-18 NOTE — ASSESSMENT & PLAN NOTE
Assessment:  Metabolic acidosis non anion gap on VBG  Albumin corrected anion gap was 11.0 mEq/L    Plan:  Continue home sodium bicarbonate 650 mg increased to 3 times daily   Low threshold to start bicarbonate infusion if unimproved

## 2023-11-18 NOTE — PROGRESS NOTES
[address] [facility]  Progress Note  Name: Amanda Templeton  MRN: [id number]  Unit/Bed#: S -65 I Date of Admission: 11/16/2023   Date of Service: 11/18/2023 I Hospital Day: 2    Assessment/Plan   * Generalized weakness  Assessment & Plan  Assessment:  Complains of generalized weakness for the past 3 days not being able to get out of bed or dress himself. Also complains of feeling lightheaded when standing up   Uses a walker at baseline  GI is following and will continue to evaluate  PT recommends more sessions to focus on progressive functional mobility training with closer monitoring of SPO2/heart rate. Reports he has been walking with walker from bed to bathroom, however feeling weak. Plan  PT/OT consult   Ambulate as tolerated     Anemia  Assessment & Plan  Assessment:  Complains of generalized weakness and shortness of breath for the past 3 days  Also complains of heavy on/off chest pain for the past 3 days  POA Hgb 7.5  Was given 1 PRBC Unit in the ED  Patient Hgb 9.7 on 11/14 at [facility]  No dark stools  Heme occult test positive in the ED  normal lactic acid  Breathing on room air  Hemoglobin stable 9.2, from 9.4. Denies bloody bowel movements, did have a BM this morning.     Plan:   Daily CBC  Hold off home Eliquis in setting of possible GI bleed  GI following, possible EGD on Monday  Continue regular diet  Transfuse if Hgb < 7    Acute on chronic heart failure (HCC)  Assessment & Plan  Wt Readings from Last 3 Encounters:   11/18/23 74.8 kg (164 lb 14.5 oz)   07/29/14 82.6 kg (182 lb)     Assessment:  According to chart, have a history of CHF  Last Echocardiogram on 3/2023: EF 60-65%  Patient does not look volume overloaded on exam, but has bilateral 1+ edema   Chest Xray: Pulmonary edema and small bilateral effusions   Patient is not on any diuretic at home-has been stopped due to hyponatremia  Has crackles at right base,+ JVD    Plan:  Is & Os  Daily Weights   Hold off diuresis for now   Cardiology consulted  Echo pending - for decision on volume management     Hyponatremia  Assessment & Plan  Assessment:  POA Sodium 129  Reviewing his chart, He had hyponatremia chronically  Sodium 131 stable  Examines overloaded    Plan:  Pending echo, may need diuresis  A.m. BMP  Hold IVF    Metabolic acidosis, NAG, bicarbonate losses  Assessment & Plan  Assessment:  Metabolic acidosis non anion gap on VBG  Albumin corrected anion gap was 11.0 mEq/L    Plan:  Continue home sodium bicarbonate 650 mg increased to 3 times daily   Low threshold to start bicarbonate infusion if unimproved    Diabetes [facility])  Assessment & Plan  Lab Results   Component Value Date    HGBA1C 6.0 (H) 11/04/2023     Recent Labs     11/17/23  1059 11/17/23  1603 11/17/23  2043 11/18/23  0640   POCGLU 128 158* 108 101   Blood Sugar Average: Last 72 hrs:  (P) [id number]    Assessment:  On Januvia at home  Glucose level stable currently with an average of 152    Plan:  Placed on ISS   Carbohydrate controlled diet  Continue to monitor blood glucose levels  Will adjust glucose regiment as needed      Prostate cancer [facility])  Assessment & Plan  Assessment:  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 Plan:  Continue home Bicalutamide   Given his Ostomy, UA and urine microscopy are not reliable  Monitor off antibiotics           VTE Pharmacologic Prophylaxis: VTE Score: 9 High Risk (Score >/= 5) - Pharmacological DVT Prophylaxis Contraindicated. Sequential Compression Devices Ordered. Mobility:   Basic Mobility Inpatient Raw Score: 19  JH-HLM Goal: 6: Walk 10 steps or more  JH-HLM Achieved: 6: Walk 10 steps or more  HLM Goal NOT achieved.  Continue with multidisciplinary rounding and encourage appropriate mobility to improve upon [facility] goals. Patient Centered Rounds: I performed bedside rounds with nursing staff today. Discussions with Specialists or Other Care Team Provider: Nursing    Education and Discussions with Family / Patient: Updated  (daughter) via phone. Current Length of Stay: 2 day(s)  Current Patient Status: Inpatient   Discharge Plan: Anticipate discharge in 48-72 hrs to pending GI and cardiac work-up    Code Status: Level 1 - Full Code    Subjective:   Overnight no events. Patient continues to report that he feels "terrible ". Reports same symptoms from yesterday such as lack of energy with addition of some shortness of breath. Otherwise denies chest pain, palpitations, nausea, vomiting. Reports 1 bowel movement this morning, denied blood or dark stools. Objective:     Vitals:   Temp (24hrs), Av.7 °F (36.5 °C), Min:97.6 °F (36.4 °C), Max:97.9 °F (36.6 °C)    Temp:  [97.6 °F (36.4 °C)-97.9 °F (36.6 °C)] 97.6 °F (36.4 °C)  HR:  [] 71  Resp:  [18-20] 18  BP: ()/(52-92) 151/92  SpO2:  [93 %-96 %] 96 %  Body mass index is 24.35 kg/m². Input and Output Summary (last 24 hours): Intake/Output Summary (Last 24 hours) at 2023 1303  Last data filed at 2023 0701  Gross per 24 hour   Intake --   Output 700 ml   Net -700 ml       Physical Exam:   Physical Exam  Vitals and nursing note reviewed. Constitutional:       General: He is not in acute distress. Appearance: Normal appearance. He is not ill-appearing. HENT:      Head: Normocephalic and atraumatic. Mouth/Throat:      Mouth: Mucous membranes are moist.      Pharynx: Oropharynx is clear. Eyes:      General: No scleral icterus. Conjunctiva/sclera: Conjunctivae normal.   Neck:      Vascular: JVD present. Cardiovascular:      Rate and Rhythm: Normal rate and regular rhythm. Pulses: Normal pulses. Heart sounds: No murmur heard. No gallop.    Pulmonary:      Effort: Pulmonary effort is normal. No respiratory distress. Breath sounds: Rales (Right base) present. No wheezing. Abdominal:      General: Bowel sounds are normal. There is no distension. Palpations: Abdomen is soft. There is no mass. Tenderness: There is no abdominal tenderness. Musculoskeletal:         General: No tenderness. Normal range of motion. Cervical back: Normal range of motion and neck supple. No tenderness. Right lower leg: No edema. Left lower leg: No edema. Skin:     General: Skin is warm and dry. Capillary Refill: Capillary refill takes less than 2 seconds. Coloration: Skin is pale. Skin is not jaundiced. Neurological:      Mental Status: He is alert and oriented to person, place, and time. Mental status is at baseline. Sensory: No sensory deficit. Psychiatric:         Mood and Affect: Mood normal.         Behavior: Behavior normal.       Additional Data:     Labs:  Results from last 7 days   Lab Units 11/18/23  0514 11/16/23  2342 11/16/23  1221   WBC Thousand/uL 8.49   < > 7.10   HEMOGLOBIN g/dL 9.2*   < > 7.5*   HEMATOCRIT % 29.1*   < > 23.3*   PLATELETS Thousands/uL 187   < > 184   NEUTROS PCT %  --   --  44   LYMPHS PCT %  --   --  41   MONOS PCT %  --   --  11   EOS PCT %  --   --  1    < > = values in this interval not displayed.      Results from last 7 days   Lab Units 11/18/23  0514 11/17/23  0505   SODIUM mmol/L 131* 131*   POTASSIUM mmol/L 4.5 4.4   CHLORIDE mmol/L 106 106   CO2 mmol/L 17* 16*   BUN mg/dL 29* 34*   CREATININE mg/dL 0.88 0.95   ANION GAP mmol/L 8 9   CALCIUM mg/dL 7.3* 7.7*   ALBUMIN g/dL  --  3.2*   TOTAL BILIRUBIN mg/dL  --  0.53   ALK PHOS U/L  --  301*   ALT U/L  --  12   AST U/L  --  16   GLUCOSE RANDOM mg/dL 101 106         Results from last 7 days   Lab Units 11/18/23  1122 11/18/23  0640 11/17/23  2043 11/17/23  1603 11/17/23  1059 11/17/23  0711 11/16/23  2103   POC GLUCOSE mg/dl 132 101 108 158* 128 108 196* Results from last 7 days   Lab Units 11/16/23  1440   LACTIC ACID mmol/L 0.4*       Lines/Drains:  Invasive Devices       Peripheral Intravenous Line  Duration             Peripheral IV 11/16/23 Right Antecubital 1 day              Drain  Duration             Urostomy RLQ 1 day                          Imaging: No pertinent imaging reviewed. Recent Cultures (last 7 days):   Results from last 7 days   Lab Units 11/16/23  1336   URINE CULTURE  >100,000 cfu/ml Pseudomonas aeruginosa*  >100,000 cfu/ml Enterococcus faecium*       Last 24 Hours Medication List:   Current Facility-Administered Medications   Medication Dose Route Frequency Provider Last Rate    amLODIPine  5 mg Oral Daily Андрей Felix MD      benzonatate  200 mg Oral TID Vinny Yo MD      bicalutamide  50 mg Oral Daily Андрей Felix MD      carvedilol  25 mg Oral BID With Meals Андрей Felix MD      docusate sodium  100 mg Oral BID Андрей Felix MD      ferrous sulfate  325 mg Oral Daily With Jacqui Daly MD      fluticasone  2 spray Nasal Daily Андрей Felix MD      insulin lispro  1-6 Units Subcutaneous 4x Daily (AC & HS) Sarika Villa MD      pantoprazole  40 mg Intravenous Q12H 300 [facility], MD      pravastatin  80 mg Oral Daily With Lenore Patel MD      sodium bicarbonate  650 mg Oral TID after meals Sarika Villa MD          Today, Patient Was Seen By: Sarika Villa MD    **Please Note: This note may have been constructed using a voice recognition system. **

## 2023-11-18 NOTE — PLAN OF CARE
Problem: Potential for Falls  Goal: Patient will remain free of falls  Description: INTERVENTIONS:  - Educate patient/family on patient safety including physical limitations  - Instruct patient to call for assistance with activity   - Consult OT/PT to assist with strengthening/mobility   - Keep Call bell within reach  - Keep bed low and locked with side rails adjusted as appropriate  - Keep care items and personal belongings within reach  - Initiate and maintain comfort rounds  - Make Fall Risk Sign visible to staff  - Offer Toileting every  Hours, in advance of need  - Initiate/Maintain alarm  - Obtain necessary fall risk management equipment:   - Apply yellow socks and bracelet for high fall risk patients  - Consider moving patient to room near nurses station  11/18/2023 1259 by Burtis Hodgkin, RN  Outcome: Progressing  11/18/2023 1259 by Burtis Hodgkin, RN  Outcome: Progressing     Problem: PAIN - ADULT  Goal: Verbalizes/displays adequate comfort level or baseline comfort level  Description: Interventions:  - Encourage patient to monitor pain and request assistance  - Assess pain using appropriate pain scale  - Administer analgesics based on type and severity of pain and evaluate response  - Implement non-pharmacological measures as appropriate and evaluate response  - Consider cultural and social influences on pain and pain management  - Notify physician/advanced practitioner if interventions unsuccessful or patient reports new pain  11/18/2023 1259 by Burtis Hodgkin, RN  Outcome: Progressing  11/18/2023 1259 by Burtis Hodgkin, RN  Outcome: Progressing     Problem: INFECTION - ADULT  Goal: Absence or prevention of progression during hospitalization  Description: INTERVENTIONS:  - Assess and monitor for signs and symptoms of infection  - Monitor lab/diagnostic results  - Monitor all insertion sites, i.e. indwelling lines, tubes, and drains  - Monitor endotracheal if appropriate and nasal secretions for changes in amount and color  - [place] appropriate cooling/warming therapies per order  - Administer medications as ordered  - Instruct and encourage patient and family to use good hand hygiene technique  - Identify and instruct in appropriate isolation precautions for identified infection/condition  11/18/2023 1259 by Jaqueline Gaspar RN  Outcome: Progressing  11/18/2023 1259 by Jaqueline Gaspar RN  Outcome: Progressing     Problem: SAFETY ADULT  Goal: Patient will remain free of falls  Description: INTERVENTIONS:  - Educate patient/family on patient safety including physical limitations  - Instruct patient to call for assistance with activity   - Consult OT/PT to assist with strengthening/mobility   - Keep Call bell within reach  - Keep bed low and locked with side rails adjusted as appropriate  - Keep care items and personal belongings within reach  - Initiate and maintain comfort rounds  - Make Fall Risk Sign visible to staff  - Offer Toileting every  Hours, in advance of need  - Initiate/Maintain alarm  - Obtain necessary fall risk management equipment:   - Apply yellow socks and bracelet for high fall risk patients  - Consider moving patient to room near nurses station  11/18/2023 1259 by Jaqueline Gaspar RN  Outcome: Progressing  11/18/2023 1259 by Jaqueline Gaspar RN  Outcome: Progressing  Goal: Maintain or return to baseline ADL function  Description: INTERVENTIONS:  -  Assess patient's ability to carry out ADLs; assess patient's baseline for ADL function and identify physical deficits which impact ability to perform ADLs (bathing, care of mouth/teeth, toileting, grooming, dressing, etc.)  - Assess/evaluate cause of self-care deficits   - Assess range of motion  - Assess patient's mobility; develop plan if impaired  - Assess patient's need for assistive devices and provide as appropriate  - Encourage maximum independence but intervene and supervise when necessary  - Involve family in performance of ADLs  - Assess for home care needs following discharge   - Consider OT consult to assist with ADL evaluation and planning for discharge  - Provide patient education as appropriate  11/18/2023 1259 by Ramandeep Ashley RN  Outcome: Progressing  11/18/2023 1259 by Ramandeep Ashley RN  Outcome: Progressing     Problem: HEMATOLOGIC - ADULT  Goal: Maintains hematologic stability  Description: INTERVENTIONS  - Assess for signs and symptoms of bleeding or hemorrhage  - Monitor labs  - Administer supportive blood products/factors as ordered and appropriate  11/18/2023 1259 by Ramandeep Ashley RN  Outcome: Progressing  11/18/2023 1259 by Ramandeep Ashley RN  Outcome: Progressing     Problem: GENITOURINARY - ADULT  Goal: Maintains or returns to baseline urinary function  Description: INTERVENTIONS:  - Assess urinary function  - Encourage oral fluids to ensure adequate hydration if ordered  - Administer IV fluids as ordered to ensure adequate hydration  - Administer ordered medications as needed  - Offer frequent toileting  - Follow urinary retention protocol if ordered  11/18/2023 1259 by Ramandeep Ashley RN  Outcome: Progressing  11/18/2023 1259 by Ramandeep Ashley RN  Outcome: Progressing  Goal: Absence of urinary retention  Description: INTERVENTIONS:  - Assess patient’s ability to void and empty bladder  - Monitor I/O  - Bladder scan as needed  - Discuss with physician/AP medications to alleviate retention as needed  - Discuss catheterization for long term situations as appropriate  11/18/2023 1259 by Ramandeep Ashley RN  Outcome: Progressing  11/18/2023 1259 by Ramandeep Ashley RN  Outcome: Progressing     Problem: METABOLIC, FLUID AND ELECTROLYTES - ADULT  Goal: Electrolytes maintained within normal limits  Description: INTERVENTIONS:  - Monitor labs and assess patient for signs and symptoms of electrolyte imbalances  - Administer electrolyte replacement as ordered  - Monitor response to electrolyte replacements, including repeat lab results as appropriate  - Instruct patient on fluid and nutrition as appropriate  11/18/2023 1259 by Sasha Jackson RN  Outcome: Progressing  11/18/2023 1259 by Sasha Jackson RN  Outcome: Progressing  Goal: Fluid balance maintained  Description: INTERVENTIONS:  - Monitor labs   - Monitor I/O and WT  - Instruct patient on fluid and nutrition as appropriate  - Assess for signs & symptoms of volume excess or deficit  11/18/2023 1259 by Sasha Jackson RN  Outcome: Progressing  11/18/2023 1259 by Sasha Jackson RN  Outcome: Progressing  Goal: Glucose maintained within target range  Description: INTERVENTIONS:  - Monitor Blood Glucose as ordered  - Assess for signs and symptoms of hyperglycemia and hypoglycemia  - Administer ordered medications to maintain glucose within target range  - Assess nutritional intake and initiate nutrition service referral as needed  11/18/2023 1259 by Sasha Jackson RN  Outcome: Progressing  11/18/2023 1259 by Sasha Jackson RN  Outcome: Progressing     Problem: SKIN/TISSUE INTEGRITY - ADULT  Goal: Skin Integrity remains intact(Skin Breakdown Prevention)  Description: Assess:  -Perform Adriel assessment every   -Clean and moisturize skin every   -Inspect skin when repositioning, toileting, and assisting with ADLS  -Assess under medical devices such as  every   -Assess extremities for adequate circulation and sensation     Bed Management:  -Have minimal linens on bed & keep smooth, unwrinkled  -Change linens as needed when moist or perspiring  -Avoid sitting or lying in one position for more than  hours while in bed  -Keep HOB at degrees     Toileting:  -Offer bedside commode  -Assess for incontinence every   -Use incontinent care products after each incontinent episode such as     Activity:  -Mobilize patient  times a day  -Encourage activity and walks on unit  -Encourage or provide ROM exercises   -Turn and reposition patient every  Hours  -Use appropriate equipment to lift or move patient in bed  -Instruct/ Assist with weight shifting every  when out of bed in chair  -Consider limitation of chair time  hour intervals    Skin Care:  -Avoid use of baby powder, tape, friction and shearing, hot water or constrictive clothing  -Relieve pressure over bony prominences using   -Do not massage red bony areas    Next Steps:  -Teach patient strategies to minimize risks such as    -Consider consults to  interdisciplinary teams such as   11/18/2023 1259 by Darryl Hawkins RN  Outcome: Progressing  11/18/2023 1259 by Darryl Hawkins RN  Outcome: Progressing  Goal: Incision(s), wounds(s) or drain site(s) healing without S/S of infection  Description: INTERVENTIONS  - Assess and document dressing, incision, wound bed, drain sites and surrounding tissue  - Provide patient and family education  - Perform skin care/dressing changes every   11/18/2023 1259 by Darryl Hawkins RN  Outcome: Progressing  11/18/2023 1259 by Darryl Hawkins RN  Outcome: Progressing  Goal: Pressure injury heals and does not worsen  Description: Interventions:  - Implement low air loss mattress or specialty surface (Criteria met)  - Apply silicone foam dressing  - Instruct/assist with weight shifting every  minutes when in chair   - Limit chair time to hour intervals  - Use special pressure reducing interventions such as  when in chair   - Apply fecal or urinary incontinence containment device   - Perform passive or active ROM every   - Turn and reposition patient & offload bony prominences every  hours   - Utilize friction reducing device or surface for transfers   - Consider consults to  interdisciplinary teams such as   - Use incontinent care products after each incontinent episode such as  - Consider nutrition services referral as needed  11/18/2023 1259 by Darryl Hawkins RN  Outcome: Progressing  11/18/2023 1259 by Darryl Hawkins RN  Outcome: Progressing

## 2023-11-19 LAB
ANION GAP SERPL CALCULATED.3IONS-SCNC: 6 MMOL/L
BUN SERPL-MCNC: 24 MG/DL (ref 5–25)
CA-I BLD-SCNC: 1.05 MMOL/L (ref 1.12–1.32)
CALCIUM SERPL-MCNC: 7.3 MG/DL (ref 8.4–10.2)
CHLORIDE SERPL-SCNC: 106 MMOL/L (ref 96–108)
CO2 SERPL-SCNC: 18 MMOL/L (ref 21–32)
CREAT SERPL-MCNC: 0.92 MG/DL (ref 0.6–1.3)
ERYTHROCYTE [DISTWIDTH] IN BLOOD BY AUTOMATED COUNT: 16.3 % (ref 11.6–15.1)
GFR SERPL CREATININE-BSD FRML MDRD: 75 ML/MIN/1.73SQ M
GLUCOSE SERPL-MCNC: 127 MG/DL (ref 65–140)
GLUCOSE SERPL-MCNC: 131 MG/DL (ref 65–140)
GLUCOSE SERPL-MCNC: 132 MG/DL (ref 65–140)
GLUCOSE SERPL-MCNC: 314 MG/DL (ref 65–140)
GLUCOSE SERPL-MCNC: 94 MG/DL (ref 65–140)
GLUCOSE SERPL-MCNC: 95 MG/DL (ref 65–140)
HCT VFR BLD AUTO: 30.1 % (ref 36.5–49.3)
HGB BLD-MCNC: 9.4 G/DL (ref 12–17)
MCH RBC QN AUTO: 29.7 PG (ref 26.8–34.3)
MCHC RBC AUTO-ENTMCNC: 31.2 G/DL (ref 31.4–37.4)
MCV RBC AUTO: 95 FL (ref 82–98)
PLATELET # BLD AUTO: 159 THOUSANDS/UL (ref 149–390)
PMV BLD AUTO: 9.4 FL (ref 8.9–12.7)
POTASSIUM SERPL-SCNC: 4.3 MMOL/L (ref 3.5–5.3)
RBC # BLD AUTO: 3.17 MILLION/UL (ref 3.88–5.62)
SODIUM SERPL-SCNC: 130 MMOL/L (ref 135–147)
WBC # BLD AUTO: 8.34 THOUSAND/UL (ref 4.31–10.16)

## 2023-11-19 PROCEDURE — 99232 SBSQ HOSP IP/OBS MODERATE 35: CPT | Performed by: INTERNAL MEDICINE

## 2023-11-19 PROCEDURE — 85027 COMPLETE CBC AUTOMATED: CPT

## 2023-11-19 PROCEDURE — C9113 INJ PANTOPRAZOLE SODIUM, VIA: HCPCS | Performed by: HOSPITALIST

## 2023-11-19 PROCEDURE — 82330 ASSAY OF CALCIUM: CPT

## 2023-11-19 PROCEDURE — 82948 REAGENT STRIP/BLOOD GLUCOSE: CPT

## 2023-11-19 PROCEDURE — 80048 BASIC METABOLIC PNL TOTAL CA: CPT

## 2023-11-19 RX ADMIN — BENZONATATE 200 MG: 100 CAPSULE ORAL at 09:38

## 2023-11-19 RX ADMIN — BICALUTAMIDE 50 MG: 50 TABLET, FILM COATED ORAL at 09:42

## 2023-11-19 RX ADMIN — DOCUSATE SODIUM 100 MG: 100 CAPSULE, LIQUID FILLED ORAL at 17:42

## 2023-11-19 RX ADMIN — PANTOPRAZOLE SODIUM 40 MG: 40 INJECTION, POWDER, FOR SOLUTION INTRAVENOUS at 21:30

## 2023-11-19 RX ADMIN — SODIUM BICARBONATE 650 MG TABLET 650 MG: at 09:37

## 2023-11-19 RX ADMIN — SODIUM BICARBONATE 650 MG TABLET 650 MG: at 13:54

## 2023-11-19 RX ADMIN — BENZONATATE 200 MG: 100 CAPSULE ORAL at 16:21

## 2023-11-19 RX ADMIN — SODIUM BICARBONATE 650 MG TABLET 650 MG: at 17:42

## 2023-11-19 RX ADMIN — PRAVASTATIN SODIUM 80 MG: 80 TABLET ORAL at 16:21

## 2023-11-19 RX ADMIN — CARVEDILOL 25 MG: 12.5 TABLET, FILM COATED ORAL at 09:40

## 2023-11-19 RX ADMIN — BENZONATATE 200 MG: 100 CAPSULE ORAL at 21:30

## 2023-11-19 RX ADMIN — CARVEDILOL 25 MG: 12.5 TABLET, FILM COATED ORAL at 16:21

## 2023-11-19 RX ADMIN — AMLODIPINE BESYLATE 5 MG: 5 TABLET ORAL at 09:37

## 2023-11-19 RX ADMIN — PANTOPRAZOLE SODIUM 40 MG: 40 INJECTION, POWDER, FOR SOLUTION INTRAVENOUS at 09:38

## 2023-11-19 RX ADMIN — DOCUSATE SODIUM 100 MG: 100 CAPSULE, LIQUID FILLED ORAL at 09:38

## 2023-11-19 RX ADMIN — FERROUS SULFATE TAB 325 MG (65 MG ELEMENTAL FE) 325 MG: 325 (65 FE) TAB at 09:40

## 2023-11-19 RX ADMIN — FLUTICASONE PROPIONATE 2 SPRAY: 50 SPRAY, METERED NASAL at 09:38

## 2023-11-19 NOTE — ASSESSMENT & PLAN NOTE
Assessment:  Complains of generalized weakness for the past 3 days not being able to get out of bed or dress himself. Also complains of feeling lightheaded when standing up   Uses a walker at baseline  GI is following and will continue to evaluate  PT recommends more sessions to focus on progressive functional mobility training with closer monitoring of SPO2/heart rate. Reports he has been walking with walker from bed to bathroom, however feeling weak. He has been intermittently tachycardic with heart rates in the 100s.     Plan  PT/OT consult   Ambulate as tolerated

## 2023-11-19 NOTE — PROGRESS NOTES
[address] [facility]  Progress Note  Name: Abdon Forbes  MRN: [id number]  Unit/Bed#: S -77 I Date of Admission: 11/16/2023   Date of Service: 11/19/2023 I Hospital Day: 3    Assessment/Plan   * Generalized weakness  Assessment & Plan  Assessment:  Complains of generalized weakness for the past 3 days not being able to get out of bed or dress himself. Also complains of feeling lightheaded when standing up   Uses a walker at baseline  GI is following and will continue to evaluate  PT recommends more sessions to focus on progressive functional mobility training with closer monitoring of SPO2/heart rate. Reports he has been walking with walker from bed to bathroom, however feeling weak. Plan  PT/OT consult   Ambulate as tolerated     Metabolic acidosis, NAG, bicarbonate losses  Assessment & Plan  Assessment:  Metabolic acidosis non anion gap on VBG  Albumin corrected anion gap was 11.0 mEq/L    Plan:  Continue home sodium bicarbonate 650 mg increased to 3 times daily   Low threshold to start bicarbonate infusion if unimproved    Diabetes [facility])  Assessment & Plan  Lab Results   Component Value Date    HGBA1C 6.0 (H) 11/04/2023     Recent Labs     11/18/23  1122 11/18/23  1623 11/18/23  2049 11/19/23  0734   POCGLU 132 133 134 94   Blood Sugar Average: Last 72 hrs:  (P) 129.2    Assessment:  On Januvia at home  Glucose level stable currently with an average of 152    Plan:  Placed on ISS   Carbohydrate controlled diet  Continue to monitor blood glucose levels  Will adjust glucose regiment as needed      Prostate cancer [facility])  Assessment & Plan  Assessment:  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   Given his Ostomy, UA and urine microscopy are not reliable    Plan:  Continue home Bicalutamide   Consider ID consult  Monitor off antibiotics    Hyponatremia  Assessment & Plan  Assessment:  POA Sodium 129  Reviewing his chart, He had hyponatremia chronically  Sodium 130 stable    Plan:  A.m. BMP  Hold IVF    Acute on chronic heart failure (HCC)  Assessment & Plan  Wt Readings from Last 3 Encounters:   11/19/23 78.5 kg (173 lb 1 oz)   07/29/14 82.6 kg (182 lb)     Assessment:  According to chart, have a history of CHF  Last Echocardiogram on 3/2023: EF 60-65%  Patient does not look volume overloaded on exam, but has bilateral 1+ edema   Chest Xray: Pulmonary edema and small bilateral effusions   Patient is not on any diuretic at home-has been stopped due to hyponatremia  Lungs are clear to auscultation bilaterally, slightly + JVD  Echocardiogram showed a left ventricular ejection fraction of 55% with normal systolic function and diastolic function that was abnormal.  He does have moderate aortic stenosis as well as an elevated right ventricular systolic pressure of 38.02 mmHg. Net -2.15 L over 24 hours    Plan:  Is & Os  Daily Weights   Hold off diuresis for now     Anemia  Assessment & Plan  Assessment:  Complains of generalized weakness and shortness of breath for the past 3 days  Also complains of heavy on/off chest pain for the past 3 days  POA Hgb 7.5  Was given 1 PRBC Unit in the ED  Patient Hgb 9.7 on 11/14 at [facility]  No dark stools  Heme occult test positive in the ED  normal lactic acid  Breathing on room air  Hemoglobin stable 9.4, from 9.2. Denies bloody bowel movements, did have a BM yesterday morning which he described as "75%"    Plan:   Daily CBC  Hold off home Eliquis in setting of possible GI bleed  GI following, possible EGD on Monday  Continue diabetic diet, increased fluid intake  Transfuse if Hgb < 7         VTE Pharmacologic Prophylaxis: VTE Score: 9 High Risk (Score >/= 5) - Pharmacological DVT Prophylaxis Contraindicated.  Sequential Compression Devices Ordered. Mobility:   Basic Mobility Inpatient Raw Score: 19  JH-HLM Goal: 6: Walk 10 steps or more  JH-HLM Achieved: 7: Walk 25 feet or more      Patient Centered Rounds: I performed bedside rounds with nursing staff today. Discussions with Specialists or Other Care Team Provider: GI, Cardiology    Education and Discussions with Family / Patient: Attempted to update  (daughter) via phone. Unable to contact. Current Length of Stay: 3 day(s)  Current Patient Status: Inpatient   Discharge Plan: Anticipate discharge in 24-48 hrs to discharge location to be determined pending rehab evaluations. Code Status: Level 1 - Full Code    Subjective:   No acute events overnight. However, patient still feels terrible. He also states he has been constipated but had a bowel movement yesterday morning. Expressed frustration that he was off his Eliquis and that he does not feel better. Objective:   He has been intermittently tachycardic with heart rates in the 100s. He had a bowel movement last night. Although he expresses frustration, he is smiling and appears rather happy during the conversation. Vitals:   Temp (24hrs), Av.1 °F (36.7 °C), Min:97.8 °F (36.6 °C), Max:98.7 °F (37.1 °C)    Temp:  [97.8 °F (36.6 °C)-98.7 °F (37.1 °C)] 97.8 °F (36.6 °C)  HR:  [] 108  Resp:  [16-18] 18  BP: (132-151)/(79-92) 132/85  SpO2:  [94 %-96 %] 94 %  Body mass index is 25.56 kg/m². Input and Output Summary (last 24 hours): Intake/Output Summary (Last 24 hours) at 2023 0824  Last data filed at 2023 0731  Gross per 24 hour   Intake 360 ml   Output 1800 ml   Net -1440 ml       Physical Exam:   Physical Exam  Vitals and nursing note reviewed. Constitutional:       General: He is not in acute distress. Appearance: Normal appearance. He is not ill-appearing. HENT:      Head: Normocephalic and atraumatic.       Mouth/Throat:      Mouth: Mucous membranes are moist. Pharynx: Oropharynx is clear. Eyes:      General: No scleral icterus. Extraocular Movements: Extraocular movements intact. Conjunctiva/sclera: Conjunctivae normal.   Neck:      Vascular: JVD present. Cardiovascular:      Rate and Rhythm: Normal rate and regular rhythm. Pulses: Normal pulses. Heart sounds: No murmur heard. No gallop. Pulmonary:      Effort: Pulmonary effort is normal. No respiratory distress. Breath sounds: No wheezing or rales (Right base). Abdominal:      General: Bowel sounds are normal. There is no distension. Palpations: Abdomen is soft. There is no mass. Tenderness: There is no abdominal tenderness. Musculoskeletal:         General: No tenderness. Normal range of motion. Cervical back: Normal range of motion and neck supple. Right lower leg: No edema. Left lower leg: No edema. Skin:     General: Skin is warm and dry. Capillary Refill: Capillary refill takes less than 2 seconds. Coloration: Skin is pale. Skin is not jaundiced. Neurological:      Mental Status: He is alert and oriented to person, place, and time. Mental status is at baseline. Sensory: No sensory deficit. Psychiatric:         Mood and Affect: Mood normal.         Behavior: Behavior normal.         Additional Data:     Labs:  Results from last 7 days   Lab Units 11/19/23  0444 11/16/23  2342 11/16/23  1221   WBC Thousand/uL 8.34   < > 7.10   HEMOGLOBIN g/dL 9.4*   < > 7.5*   HEMATOCRIT % 30.1*   < > 23.3*   PLATELETS Thousands/uL 159   < > 184   NEUTROS PCT %  --   --  44   LYMPHS PCT %  --   --  41   MONOS PCT %  --   --  11   EOS PCT %  --   --  1    < > = values in this interval not displayed.      Results from last 7 days   Lab Units 11/19/23  0448 11/18/23  0514 11/17/23  0505   SODIUM mmol/L 130*   < > 131*   POTASSIUM mmol/L 4.3   < > 4.4   CHLORIDE mmol/L 106   < > 106   CO2 mmol/L 18*   < > 16*   BUN mg/dL 24   < > 34*   CREATININE mg/dL 0.92   < > 0.95   ANION GAP mmol/L 6   < > 9   CALCIUM mg/dL 7.3*   < > 7.7*   ALBUMIN g/dL  --   --  3.2*   TOTAL BILIRUBIN mg/dL  --   --  0.53   ALK PHOS U/L  --   --  301*   ALT U/L  --   --  12   AST U/L  --   --  16   GLUCOSE RANDOM mg/dL 95   < > 106    < > = values in this interval not displayed. Results from last 7 days   Lab Units 11/19/23  0734 11/18/23  2049 11/18/23  1623 11/18/23  1122 11/18/23  0640 11/17/23  2043 11/17/23  1603 11/17/23  1059 11/17/23  0711 11/16/23  2103   POC GLUCOSE mg/dl 94 134 133 132 101 108 158* 128 108 196*         Results from last 7 days   Lab Units 11/16/23  1440   LACTIC ACID mmol/L 0.4*       Lines/Drains:  Invasive Devices       Peripheral Intravenous Line  Duration             Peripheral IV 11/16/23 Right Antecubital 2 days              Drain  Duration             Urostomy RLQ 2 days                          Imaging: No pertinent imaging reviewed.     Recent Cultures (last 7 days):   Results from last 7 days   Lab Units 11/16/23  1336   URINE CULTURE  >100,000 cfu/ml Pseudomonas aeruginosa*  >100,000 cfu/ml Enterococcus faecium*       Last 24 Hours Medication List:   Current Facility-Administered Medications   Medication Dose Route Frequency Provider Last Rate    amLODIPine  5 mg Oral Daily Ivana Mcgowan MD      benzonatate  200 mg Oral TID Mariza Barcenas MD      bicalutamide  50 mg Oral Daily Ivana Mcgowan MD      carvedilol  25 mg Oral BID With Meals Ivana Mcgowan MD      docusate sodium  100 mg Oral BID Ivana Mcgowan MD      ferrous sulfate  325 mg Oral Daily With Arianna Arana MD      fluticasone  2 spray Nasal Daily Ivana Mcgowan MD      insulin lispro  1-6 Units Subcutaneous 4x Daily (AC & HS) Barbi Paul MD      pantoprazole  40 mg Intravenous Q12H 300 [facility], MD      pravastatin  80 mg Oral Daily With Judith Crawley MD      sodium bicarbonate  650 mg Oral TID after meals Barbi Paul MD          Today, Patient Was Seen By: Vinny Yo MD    **Please Note: This note may have been constructed using a voice recognition system. **

## 2023-11-19 NOTE — ASSESSMENT & PLAN NOTE
Assessment:  Metabolic acidosis non anion gap on VBG  Albumin corrected anion gap was 11.0 mEq/L  Currently saturating well on room air.     Plan:  Continue home sodium bicarbonate 650 mg increased to 3 times daily   Low threshold to start bicarbonate infusion if unimproved

## 2023-11-19 NOTE — ASSESSMENT & PLAN NOTE
Wt Readings from Last 3 Encounters:   11/19/23 78.5 kg (173 lb 1 oz)   07/29/14 82.6 kg (182 lb)     Assessment:  According to chart, have a history of CHF  Last Echocardiogram on 3/2023: EF 60-65%  Patient does not look volume overloaded on exam, but has bilateral 1+ edema   Chest Xray: Pulmonary edema and small bilateral effusions   Patient is not on any diuretic at home-has been stopped due to hyponatremia  Lungs are clear to auscultation bilaterally, slightly + JVD  Echocardiogram showed a left ventricular ejection fraction of 55% with normal systolic function and diastolic function that was abnormal.  He does have moderate aortic stenosis as well as an elevated right ventricular systolic pressure of 31.70 mmHg.   Net -2.15 L over 24 hours    Plan:  Is & Os  Daily Weights   Hold off diuresis for now

## 2023-11-19 NOTE — PROGRESS NOTES
Cardiology Progress Note - Jared Christopher 82 y.o. male MRN: [id number]    Unit/Bed#: S -01 Encounter: [id number]    Assessment and plan  #1 shortness of breath multifactorial  #2 ischemic cardiomyopathy ejection fraction normalized  #3 metastatic prostate cancer  #4 hyponatremia  #5 chronic atrial fibrillation  #6 moderate aortic stenosis, mild to moderate MR, moderate to severe TR  #7 hypertension  #8 malnutrition deconditioning  #9 possible GI bleed  Recommendations: The patient does not appear to be in decompensated failure on exam I do not think he needs aggressive diuresis. Echocardiogram relatively stable to old. Eliquis held for possible GI bleeding. Continue supportive care at this point in time. Can use diuretics on an as-needed basis. Please call if any questions    Subjective:    No significant events overnight. ROS    Objective:   Vitals: Blood pressure 132/85, pulse (!) 108, temperature 97.8 °F (36.6 °C), resp. rate 18, height 5' 9" (1.753 m), weight 78.5 kg (173 lb 1 oz), SpO2 94 %. , Body mass index is 25.56 kg/m².,   Orthostatic Blood Pressures      Flowsheet Row Most Recent Value   Blood Pressure 132/85 filed at 11/19/2023 0731   Patient Position - Orthostatic VS Sitting filed at 11/18/2023 5746           Systolic (90YAU), OID:342 , Min:132 , GOV:698     Diastolic (16OMX), TKC:23, Min:79, Max:89      Intake/Output Summary (Last 24 hours) at 11/19/2023 1302  Last data filed at 11/19/2023 0731  Gross per 24 hour   Intake 360 ml   Output 1800 ml   Net -1440 ml     Weight (last 2 days)       Date/Time Weight    11/19/23 0600 78.5 (173.06)    11/18/23 1100 74.8 (164.9)    11/18/23 07:40:38 74.8 (164.9)    11/17/23 0600 78 (171.96)              Telemetry Review: No significant arrhythmias seen on telemetry review. EKG personally reviewed by Hillary Dias, DO. Physical Exam  Vitals and nursing note reviewed. Constitutional:       General: He is not in acute distress. Appearance: He is well-developed. HENT:      Head: Normocephalic and atraumatic. Eyes:      Conjunctiva/sclera: Conjunctivae normal.      Pupils: Pupils are equal, round, and reactive to light. Cardiovascular:      Rate and Rhythm: Normal rate and regular rhythm. Heart sounds: Murmur heard. No friction rub. Pulmonary:      Effort: Pulmonary effort is normal. No respiratory distress. Breath sounds: Normal breath sounds. No wheezing or rales. Abdominal:      General: Bowel sounds are normal. There is no distension. Palpations: Abdomen is soft. Tenderness: There is no abdominal tenderness. There is no rebound. Musculoskeletal:         General: No tenderness or deformity. Normal range of motion. Cervical back: Neck supple. Skin:     General: Skin is warm and dry. Findings: No erythema. Neurological:      Mental Status: He is alert and oriented to person, place, and time. Cranial Nerves: No cranial nerve deficit.            Laboratory Results:        CBC with diff:   Results from last 7 days   Lab Units 11/19/23  0444 11/18/23  0514 11/17/23  1805 11/17/23  0954 11/17/23  0505 11/16/23  2342 11/16/23  1221   WBC Thousand/uL 8.34 8.49  --   --  8.27  --  7.10   HEMOGLOBIN g/dL 9.4* 9.2* 9.0* 9.3* 9.4* 9.0* 7.5*   HEMATOCRIT % 30.1* 29.1*  --   --  28.5*  --  23.3*   MCV fL 95 95  --   --  94  --  97   PLATELETS Thousands/uL 159 187  --   --  206  --  184   RBC Million/uL 3.17* 3.06*  --   --  3.03*  --  2.40*   MCH pg 29.7 30.1  --   --  31.0  --  31.3   MCHC g/dL 31.2* 31.6  --   --  33.0  --  32.2   RDW % 16.3* 16.6*  --   --  16.5*  --  15.5*   MPV fL 9.4 9.8  --   --  10.2  --  10.1   NRBC AUTO /100 WBCs  --   --   --   --   --   --  0         CMP:  Results from last 7 days   Lab Units 11/19/23  0448 11/18/23  0514 11/17/23  0505 11/16/23  1221   POTASSIUM mmol/L 4.3 4.5 4.4 4.9   CHLORIDE mmol/L 106 106 106 104   CO2 mmol/L 18* 17* 16* 17*   BUN mg/dL 24 29* 34* 42*   CREATININE mg/dL 0.92 0.88 0.95 1.29   CALCIUM mg/dL 7.3* 7.3* 7.7* 7.5*   AST U/L  --   --  16 14   ALT U/L  --   --  12 13   ALK PHOS U/L  --   --  301* 293*   EGFR ml/min/1.73sq m 75 77 72 49         BMP:  Results from last 7 days   Lab Units 11/19/23  0448 11/18/23  0514 11/17/23  0505 11/16/23  1221   POTASSIUM mmol/L 4.3 4.5 4.4 4.9   CHLORIDE mmol/L 106 106 106 104   CO2 mmol/L 18* 17* 16* 17*   BUN mg/dL 24 29* 34* 42*   CREATININE mg/dL 0.92 0.88 0.95 1.29   CALCIUM mg/dL 7.3* 7.3* 7.7* 7.5*       BNP: No results for input(s): "BNP" in the last 72 hours. Magnesium:       Coags:       TSH:        Hemoglobin A1C       Lipid Profile:       Cardiac testing:   No results found for this or any previous visit. No results found for this or any previous visit. No results found for this or any previous visit. No results found for this or any previous visit. Meds/Allergies   all current active meds have been reviewed  Medications Prior to Admission   Medication    amLODIPine (NORVASC) 5 mg tablet    apixaban (ELIQUIS) 5 mg    bicalutamide (CASODEX) 50 mg tablet    carvedilol (COREG) 25 mg tablet    docusate sodium (COLACE) 100 mg capsule    fluticasone (FLONASE) 50 mcg/act nasal spray    omeprazole (PriLOSEC) 20 mg delayed release capsule    oral electrolytes (THERMOTABS) TABS    rosuvastatin (CRESTOR) 10 MG tablet    sitaGLIPtin (JANUVIA) 50 mg tablet    sodium bicarbonate 650 mg tablet    Cholecalciferol 25 MCG (1000 UT) capsule    ferrous sulfate 325 (65 Fe) mg tablet    multivitamin (THERAGRAN) TABS    OMEGA-3 FATTY ACIDS-VITAMIN E PO          Assessment:  Principal Problem:    Generalized weakness  Active Problems:    Anemia    Acute on chronic heart failure (HCC)    Hyponatremia    Prostate cancer (HCC)    Diabetes (HCC)    Metabolic acidosis, NAG, bicarbonate losses            Counseling / Coordination of Care  Total floor / unit time spent today 25 minutes.   Greater than 50% of total time was spent with the patient and / or family counseling and / or coordination of care. A description of the counseling / coordination of care: Clarissa Jackson

## 2023-11-19 NOTE — ASSESSMENT & PLAN NOTE
Assessment:  POA Sodium 129  Reviewing his chart, He had hyponatremia chronically  Sodium 132 stable    Plan:  A.m.  BMP  Hold IVF

## 2023-11-19 NOTE — ASSESSMENT & PLAN NOTE
Assessment:  Metabolic acidosis non anion gap on VBG  Albumin corrected anion gap was 11.0 mEq/L  Currently saturating well on room air.     Plan:  Continue home sodium bicarbonate 650 mg 3 times daily   Low threshold to start bicarbonate infusion if unimproved

## 2023-11-19 NOTE — ASSESSMENT & PLAN NOTE
Wt Readings from Last 3 Encounters:   11/19/23 78.5 kg (173 lb 1 oz)   07/29/14 82.6 kg (182 lb)     Assessment:  According to chart, have a history of CHF  Last Echocardiogram on 3/2023: EF 60-65%  Patient does not look volume overloaded on exam, but has bilateral 1+ edema   Chest Xray: Pulmonary edema and small bilateral effusions   Patient is not on any diuretic at home-has been stopped due to hyponatremia  Lungs are clear to auscultation bilaterally, slightly + JVD  Echocardiogram showed a left ventricular ejection fraction of 55% with normal systolic function and diastolic function that was abnormal.  He does have moderate aortic stenosis as well as an elevated right ventricular systolic pressure of 20.99 mmHg.   Net -2.15 L over 24 hours    Plan:  Is & Os  Daily Weights   Hold off diuresis for now

## 2023-11-19 NOTE — ASSESSMENT & PLAN NOTE
Assessment:  Complains of generalized weakness for the past 3 days not being able to get out of bed or dress himself. Also complains of feeling lightheaded when standing up   Uses a walker at baseline  GI is following and will continue to evaluate  PT recommends more sessions to focus on progressive functional mobility training with closer monitoring of SPO2/heart rate. Reports he has been walking with walker from bed to bathroom, however feeling weak. He has been intermittently tachycardic with heart rates in the 100s.     Plan  PT/OT consult, recommend home with home rehab  Ambulate as tolerated

## 2023-11-19 NOTE — ASSESSMENT & PLAN NOTE
Assessment:  Complains of generalized weakness and shortness of breath for the past 3 days  Also complains of heavy on/off chest pain for the past 3 days  POA Hgb 7.5  Was given 1 PRBC Unit in the ED  Patient Hgb 9.7 on 11/14 at [facility]  No dark stools  Heme occult test positive in the ED  normal lactic acid  Breathing on room air  Hemoglobin stable 9.4, from 9.2.   Denies bloody bowel movements, did have a BM yesterday morning which he described as "75%"    Plan:   Daily CBC  Hold off home Eliquis in setting of possible GI bleed  GI following, possible EGD on Monday  Continue diabetic diet, increased fluid intake  Transfuse if Hgb < 7

## 2023-11-19 NOTE — ASSESSMENT & PLAN NOTE
Assessment:  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   Given his Ostomy, UA and urine microscopy are not reliable  ID curbsided, suspect Urine culture is not acute infection, in absence of symptoms or labs suggesting infectious    Plan:  Continue home Bicalutamide   Monitor off antibiotics

## 2023-11-19 NOTE — ASSESSMENT & PLAN NOTE
Assessment:  Complains of generalized weakness and shortness of breath for the past 3 days  Also complains of heavy on/off chest pain for the past 3 days  POA Hgb 7.5  Was given 1 PRBC Unit in the ED  Patient Hgb 9.7 on 11/14 at [facility]  No dark stools  Heme occult test positive in the ED  normal lactic acid  Breathing on room air  GI following, do no suspect GI bleed as Hb stable and no signs of bleeding  Denies bloody bowel movements, did have a BM this morning     Plan:   Daily CBC  Continue home Eliquis, per GI, as bleed not suspected  Continue diabetic diet, increased fluid intake  Transfuse if Hgb < 7

## 2023-11-19 NOTE — ASSESSMENT & PLAN NOTE
Lab Results   Component Value Date    HGBA1C 6.0 (H) 11/04/2023     Recent Labs     11/19/23  2047 11/20/23  0735 11/20/23  1105 11/20/23  1536   POCGLU 132 115 103 112   Blood Sugar Average: Last 72 hrs:  (P) 129.2    Assessment:  On Januvia at home  Glucose level stable currently with an average of 152    Plan:  Placed on ISS   Carbohydrate controlled diet  Continue to monitor blood glucose levels  Will adjust glucose regiment as needed

## 2023-11-19 NOTE — ASSESSMENT & PLAN NOTE
Assessment:  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   Given his Ostomy, UA and urine microscopy are not reliable    Plan:  Continue home Bicalutamide   Consider ID consult  Monitor off antibiotics

## 2023-11-19 NOTE — ASSESSMENT & PLAN NOTE
Lab Results   Component Value Date    HGBA1C 6.0 (H) 11/04/2023     Recent Labs     11/18/23  1122 11/18/23  1623 11/18/23 2049 11/19/23  0734   POCGLU 132 133 134 94   Blood Sugar Average: Last 72 hrs:  (P) 129.2    Assessment:  On Januvia at home  Glucose level stable currently with an average of 152    Plan:  Placed on ISS   Carbohydrate controlled diet  Continue to monitor blood glucose levels  Will adjust glucose regiment as needed

## 2023-11-19 NOTE — DISCHARGE SUMMARY
[address] [facility]  Discharge- Rosy Lopez 1935, 82 y.o. male MRN: [id number]  Unit/Bed#: S -01 Encounter: [id number]  Primary Care Provider: No primary care provider on file. Date and time admitted to hospital: 11/16/2023 12:04 PM    * Generalized weakness  Assessment & Plan  Assessment:  Complaint of generalized weakness for 3 days prior to admission not being able to get out of bed or dress himself. Also complained of feeling lightheaded when standing up   Uses a walker at baseline  GI is following, says GI bleed unlikely. Suspect anemia is secondary to malignancy. Reports he has been walking with walker from bed to bathroom, and feeling less weak   He has been intermittently tachycardic with heart rates in the 100s. As of discharge, patient ambulating well around room, and is overall feeling better  Patient lives at home with daughter. Discussed discharge and PT/OT recommendations with daughter via phone. She expressed understating. Plan  Discharge home  Home PT referral placed  Outpatient follow up with PCP    Metabolic acidosis, NAG, bicarbonate losses  Assessment & Plan  Assessment:  Metabolic acidosis non anion gap on VBG  Albumin corrected anion gap was 11.0 mEq/L  Currently saturating well on room air.     Plan:  Continue home sodium bicarbonate 650 mg 3 times daily   Outpatient follow up with PCP    Diabetes [facility])  Assessment & Plan  Lab Results   Component Value Date    HGBA1C 6.0 (H) 11/04/2023     Recent Labs     11/20/23  1105 11/20/23  1536 11/20/23  2051 11/21/23  0743   POCGLU 103 112 138 90   Blood Sugar Average: Last 72 hrs:  (P) 133.125    Assessment:  On Januvia at home  Placed on ISS while inpatient  Glucose level stable currently with an average of 133    Plan:  Resume home regimen on discharge  Outpatient follow up with PCP      Prostate cancer [facility])  Assessment & Plan  Assessment:  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 faecium, likely colonization. Given his Ostomy, UA and urine microscopy are not reliable  ID curbsided, suspect Urine culture is not acute infection, in absence of symptoms or labs suggesting infection. No indication for antibiotics. Plan:  Continue home Bicalutamide   Outpatient Heme Onc follow up    Hyponatremia  Assessment & Plan  Assessment:  POA Sodium 129  Reviewing his chart, He had hyponatremia chronically likely related to malignancy  Sodium 131 stable    Plan:  Outpatient follow up with PCP    Acute on chronic heart failure (HCC)  Assessment & Plan  Wt Readings from Last 3 Encounters:   11/20/23 75.1 kg (165 lb 9.1 oz)   07/29/14 82.6 kg (182 lb)     Assessment:  According to chart, have a history of CHF  Last Echocardiogram on 3/2023: EF 60-65%  Patient does not look volume overloaded on exam, but has bilateral 1+ edema   Chest Xray: Pulmonary edema and small bilateral effusions   Patient is not on any diuretic at home-has been stopped due to hyponatremia  Lungs are clear to auscultation bilaterally, slightly + JVD  Echocardiogram showed a left ventricular ejection fraction of 55% with normal systolic function and diastolic function that was abnormal.  He does have moderate aortic stenosis as well as an elevated right ventricular systolic pressure of 52.44 mmHg.   Per cardiology, patient does not appear to be in decompensated heart failure, and does not need aggressive diuresis    Plan:  Outpatient follow up with PCP    Anemia  Assessment & Plan  Recent Labs     11/19/23  0444 11/20/23  0522 11/21/23  0441   HGB 9.4* 9.8* 9.8*   MCV 95 94 94     Likely multifactorial in context of malignancy history    Presented with complaint of generalized weakness and shortness of breath for 3 days  Also complained of heavy on/off chest pain for 3 days  POA Hgb 7.5  Was given 1 PRBC Unit in the ED  Patient Hgb 9.7 on 11/14 at [facility]  No dark stools  Heme occult test positive in the ED  normal lactic acid  Breathing on room air  GI following, do no suspect GI bleed as Hb stable and no signs of bleeding  Denies bloody bowel movements, has been having daily Bms  Home Eliquis resumed per GI, as bleed not suspected  As of discharge, hemoglobin stable with no signs of bleeding    Plan:   Outpatient follow up with PCP and Hematology      Medical Problems       Resolved Problems  Date Reviewed: 11/18/2023   None       Discharging Resident: Keke Cardozo MD  Discharging Attending: Sharyle Maltos, MD  PCP: No primary care provider on file. Admission Date:   Admission Orders (From admission, onward)       Ordered        11/16/23 1116  [address]  Once                          Discharge Date: 11/21/23    Consultations During Hospital Stay:  GI, Cardiology    Procedures Performed:   None    Significant Findings / Test Results:   Hgb of 7.5  XR chest 1 view portable  Result Date: 11/16/2023  Impression: Changes above are consistent with pulmonary edema and small bilateral pleural effusions. The study was marked in [facility] for immediate notification. Workstation performed: [id number]     Incidental Findings:     Test Results Pending at Discharge (will require follow up): None     Outpatient Tests Requested:  None    Complications:  None    Reason for Admission: Generalized weakness    Hospital Course:   Isidro Stokes is a 82 y.o. male patient with past medical history significant for metastatic prostate cancer status post radical cystoprostatectomy with ileal conduit, hypertension, diabetes, A-fib on Eliquis who originally presented to the hospital on 11/16/2023 due to generalized weakness and dyspnea on exertion.   In the ED he was found to have a hemoglobin of 7.5 as well as a positive fecal occult blood test and was transfused 1 unit of packed red blood cells. After this transfusion his hemoglobin stabilized at around 9.5; however, he continued to express weakness and feeling generally unwell. Cardiology was consulted and recommended we get an echocardiogram which showed an ejection fraction of 55% and normal systolic function and moderate aortic stenosis. GI was then consulted, and did not suspect an ongoing bleed, as hemoglobin stabilized and there were no signs of bleeding. GI did not recommend any procedures, and recommended resuming home Eliquis for Afib. Suspect anemia is multifactorial in setting of malignancy. ID was curbsided for positive urine culture from cystostomy bag. ID agreed that urine culture is result of colonization, not acute infection, in absence of symptoms or labs suggesting infection, so no antibiotics were indicated. As of discharge, patient is ambulating well around his room, and feeling much less weak. Suspect his weakness and anemia are related to his malignancy, as the rest of his workup was negative. Patient stable for discharge home with outpatient follow up with PCP and Hematology. Please see above list of diagnoses and related plan for additional information. Condition at Discharge: stable    Discharge Day Visit / Exam:   Subjective:  Patient seen and examined lying in bed this morning. The patient states that he is feeling well overall this morning. He feels that he has been walking well and is overall less weak. He denies fever, chills, shortness of breath, cough, chest pain, palpitations, abdominal pain, nausea, vomiting, diarrhea, constipation, or worsening leg swelling. All questions answered.    Vitals: Blood Pressure: 147/89 (11/21/23 1508)  Pulse: 103 (11/21/23 1508)  Temperature: 98.6 °F (37 °C) (11/21/23 1508)  Temp Source: Oral (11/18/23 0740)  Respirations: 17 (11/21/23 0743)  Height: 5' 9" (175.3 cm) (11/18/23 1100)  Weight - Scale: 75.1 kg (165 lb 9.1 oz) (11/20/23 0600)  SpO2: 94 % (11/21/23 3777)  Exam:   Physical Exam  Vitals and nursing note reviewed. Constitutional:       General: He is not in acute distress. Appearance: He is not ill-appearing. HENT:      Head: Normocephalic and atraumatic. Mouth/Throat:      Mouth: Mucous membranes are moist.      Pharynx: Oropharynx is clear. Eyes:      General: No scleral icterus. Conjunctiva/sclera: Conjunctivae normal.   Cardiovascular:      Rate and Rhythm: Normal rate. Rhythm irregular. Pulses: Normal pulses. Heart sounds: Normal heart sounds. Pulmonary:      Effort: Pulmonary effort is normal.      Breath sounds: Normal breath sounds. Abdominal:      General: Abdomen is flat. Bowel sounds are normal. There is no distension. Palpations: Abdomen is soft. There is no mass. Tenderness: There is no abdominal tenderness. There is no guarding. Musculoskeletal:      Cervical back: Neck supple. Right lower leg: Edema present. Left lower leg: No edema. Skin:     General: Skin is warm and dry. Neurological:      General: No focal deficit present. Mental Status: He is alert and oriented to person, place, and time. Mental status is at baseline. Psychiatric:         Mood and Affect: Mood normal.        Discussion with Family: Updated  (daughter) via phone. Discussed plan for discharge home, as well as PT and OT recommendations, including recommendation for increased supervision in the home. Discussed that  has placed referral for home PT services. Patient's daughter expressed understanding. Discharge instructions/Information to patient and family:   See after visit summary for information provided to patient and family. Provisions for Follow-Up Care:  See after visit summary for information related to follow-up care and any pertinent home health orders.       Mobility at time of Discharge:   Basic Mobility Inpatient Raw Score: 19  -HLM Goal: 6: Walk 10 steps or more  JH-HLM Achieved: 6: Walk 10 steps or more  HLM Goal achieved. Continue to encourage appropriate mobility. Disposition:   Home    Planned Readmission: No    Discharge Medications:  See after visit summary for reconciled discharge medications provided to patient and/or family.       **Please Note: This note may have been constructed using a voice recognition system**

## 2023-11-19 NOTE — ASSESSMENT & PLAN NOTE
Assessment:  POA Sodium 129  Reviewing his chart, He had hyponatremia chronically  Sodium 130 stable    Plan:  A.m.  RYLAN  Hold IVF

## 2023-11-20 LAB
ANION GAP SERPL CALCULATED.3IONS-SCNC: 7 MMOL/L
BASOPHILS # BLD AUTO: 0.05 THOUSANDS/ÂΜL (ref 0–0.1)
BASOPHILS NFR BLD AUTO: 1 % (ref 0–1)
BUN SERPL-MCNC: 23 MG/DL (ref 5–25)
CALCIUM SERPL-MCNC: 7.5 MG/DL (ref 8.4–10.2)
CHLORIDE SERPL-SCNC: 105 MMOL/L (ref 96–108)
CO2 SERPL-SCNC: 20 MMOL/L (ref 21–32)
CREAT SERPL-MCNC: 0.8 MG/DL (ref 0.6–1.3)
EOSINOPHIL # BLD AUTO: 0.33 THOUSAND/ÂΜL (ref 0–0.61)
EOSINOPHIL NFR BLD AUTO: 4 % (ref 0–6)
ERYTHROCYTE [DISTWIDTH] IN BLOOD BY AUTOMATED COUNT: 16.4 % (ref 11.6–15.1)
GFR SERPL CREATININE-BSD FRML MDRD: 80 ML/MIN/1.73SQ M
GLUCOSE SERPL-MCNC: 103 MG/DL (ref 65–140)
GLUCOSE SERPL-MCNC: 112 MG/DL (ref 65–140)
GLUCOSE SERPL-MCNC: 115 MG/DL (ref 65–140)
GLUCOSE SERPL-MCNC: 138 MG/DL (ref 65–140)
GLUCOSE SERPL-MCNC: 92 MG/DL (ref 65–140)
HCT VFR BLD AUTO: 30.9 % (ref 36.5–49.3)
HGB BLD-MCNC: 9.8 G/DL (ref 12–17)
IMM GRANULOCYTES # BLD AUTO: 0.15 THOUSAND/UL (ref 0–0.2)
IMM GRANULOCYTES NFR BLD AUTO: 2 % (ref 0–2)
LYMPHOCYTES # BLD AUTO: 3.95 THOUSANDS/ÂΜL (ref 0.6–4.47)
LYMPHOCYTES NFR BLD AUTO: 44 % (ref 14–44)
MCH RBC QN AUTO: 29.9 PG (ref 26.8–34.3)
MCHC RBC AUTO-ENTMCNC: 31.7 G/DL (ref 31.4–37.4)
MCV RBC AUTO: 94 FL (ref 82–98)
MONOCYTES # BLD AUTO: 0.76 THOUSAND/ÂΜL (ref 0.17–1.22)
MONOCYTES NFR BLD AUTO: 9 % (ref 4–12)
NEUTROPHILS # BLD AUTO: 3.45 THOUSANDS/ÂΜL (ref 1.85–7.62)
NEUTS SEG NFR BLD AUTO: 40 % (ref 43–75)
NRBC BLD AUTO-RTO: 0 /100 WBCS
PLATELET # BLD AUTO: 161 THOUSANDS/UL (ref 149–390)
PMV BLD AUTO: 9.9 FL (ref 8.9–12.7)
POTASSIUM SERPL-SCNC: 4.5 MMOL/L (ref 3.5–5.3)
RBC # BLD AUTO: 3.28 MILLION/UL (ref 3.88–5.62)
SODIUM SERPL-SCNC: 132 MMOL/L (ref 135–147)
WBC # BLD AUTO: 8.69 THOUSAND/UL (ref 4.31–10.16)

## 2023-11-20 PROCEDURE — C9113 INJ PANTOPRAZOLE SODIUM, VIA: HCPCS | Performed by: HOSPITALIST

## 2023-11-20 PROCEDURE — 80048 BASIC METABOLIC PNL TOTAL CA: CPT

## 2023-11-20 PROCEDURE — 99232 SBSQ HOSP IP/OBS MODERATE 35: CPT | Performed by: INTERNAL MEDICINE

## 2023-11-20 PROCEDURE — 97129 THER IVNTJ 1ST 15 MIN: CPT

## 2023-11-20 PROCEDURE — 85025 COMPLETE CBC W/AUTO DIFF WBC: CPT

## 2023-11-20 PROCEDURE — 99231 SBSQ HOSP IP/OBS SF/LOW 25: CPT | Performed by: INTERNAL MEDICINE

## 2023-11-20 PROCEDURE — 97110 THERAPEUTIC EXERCISES: CPT

## 2023-11-20 PROCEDURE — 97116 GAIT TRAINING THERAPY: CPT

## 2023-11-20 PROCEDURE — 82948 REAGENT STRIP/BLOOD GLUCOSE: CPT

## 2023-11-20 PROCEDURE — 97167 OT EVAL HIGH COMPLEX 60 MIN: CPT

## 2023-11-20 PROCEDURE — 97530 THERAPEUTIC ACTIVITIES: CPT

## 2023-11-20 RX ADMIN — FERROUS SULFATE TAB 325 MG (65 MG ELEMENTAL FE) 325 MG: 325 (65 FE) TAB at 08:31

## 2023-11-20 RX ADMIN — SODIUM BICARBONATE 650 MG TABLET 650 MG: at 11:57

## 2023-11-20 RX ADMIN — AMLODIPINE BESYLATE 5 MG: 5 TABLET ORAL at 08:30

## 2023-11-20 RX ADMIN — PRAVASTATIN SODIUM 80 MG: 80 TABLET ORAL at 17:55

## 2023-11-20 RX ADMIN — APIXABAN 5 MG: 5 TABLET, FILM COATED ORAL at 21:18

## 2023-11-20 RX ADMIN — BENZONATATE 200 MG: 100 CAPSULE ORAL at 21:18

## 2023-11-20 RX ADMIN — BENZONATATE 200 MG: 100 CAPSULE ORAL at 08:30

## 2023-11-20 RX ADMIN — SODIUM BICARBONATE 650 MG TABLET 650 MG: at 08:30

## 2023-11-20 RX ADMIN — SODIUM BICARBONATE 650 MG TABLET 650 MG: at 17:55

## 2023-11-20 RX ADMIN — FLUTICASONE PROPIONATE 2 SPRAY: 50 SPRAY, METERED NASAL at 08:29

## 2023-11-20 RX ADMIN — BICALUTAMIDE 50 MG: 50 TABLET, FILM COATED ORAL at 08:31

## 2023-11-20 RX ADMIN — PANTOPRAZOLE SODIUM 40 MG: 40 INJECTION, POWDER, FOR SOLUTION INTRAVENOUS at 08:31

## 2023-11-20 RX ADMIN — PANTOPRAZOLE SODIUM 40 MG: 40 INJECTION, POWDER, FOR SOLUTION INTRAVENOUS at 21:22

## 2023-11-20 RX ADMIN — DOCUSATE SODIUM 100 MG: 100 CAPSULE, LIQUID FILLED ORAL at 17:55

## 2023-11-20 RX ADMIN — BENZONATATE 200 MG: 100 CAPSULE ORAL at 17:56

## 2023-11-20 RX ADMIN — DOCUSATE SODIUM 100 MG: 100 CAPSULE, LIQUID FILLED ORAL at 08:30

## 2023-11-20 NOTE — PLAN OF CARE
Problem: PHYSICAL THERAPY ADULT  Goal: Performs mobility at highest level of function for planned discharge setting. See evaluation for individualized goals. Description: Treatment/Interventions: Functional transfer training, Therapeutic exercise, Endurance training, Cognitive reorientation, Patient/family training, Gait training, Bed mobility, Equipment eval/education, LE strengthening/ROM, Spoke to nursing, Spoke to MD  Equipment Recommended: Maliha Sainz       See flowsheet documentation for full assessment, interventions and recommendations. Outcome: Progressing  Note: Prognosis: Fair  Problem List: Decreased strength, Decreased endurance, Impaired balance, Decreased mobility, Decreased cognition, Impaired judgement, Decreased safety awareness, Impaired hearing (gait deviation s)  Assessment: pt began tx session in bathroom and was agreeable to participate in PT intervention. pt continues to remain consistant with requiring mod i for all bed mobility and functional transfers to and from RW. pt demonstrated good recall from previous tx sessions on hand placement while ascending to RW and descending to toilet/EOB in order to increase safety and balance. pt was able to increase activity tolerance, ambulation distance and steps completed in todays tx session. pt ambulated 160'x1 RW w/ close /s , no LOB but did demonstrate degradation of gait due to fatigue as pt required  a therapeutic seated rest break post ambulation. pt educated w/ verbal/visual demonstration on all safe stair trial strategies. pt did complete 10 steps in todays tx session. pt was able to complete initial 7 steps with close /s but last 3 steps required min Ax1 with bilateral hand rail use. pt would benefit from continued skilled PT intervention in order to increase activity tolerance, ambulation distance and steps completed w/ less assistance required. Post tx pt in bed with call bell and all pt needs met.   Barriers to Discharge: Decreased caregiver support     Rehab Resource Intensity Level, PT: III (Minimum Resource Intensity)    See flowsheet documentation for full assessment.

## 2023-11-20 NOTE — ASSESSMENT & PLAN NOTE
Assessment:  Metabolic acidosis non anion gap on VBG  Albumin corrected anion gap was 11.0 mEq/L  Currently saturating well on room air.     Plan:  Continue home sodium bicarbonate 650 mg 3 times daily   Outpatient follow up with PCP

## 2023-11-20 NOTE — CASE MANAGEMENT
Case Management Progress Note    Patient name Christy Lacey  Location S /S -01 MRN [id number]  : 1935 Date 2023       LOS (days): 4  Geometric Mean LOS (GMLOS) (days): 3.00  Days to GMLOS:-0.9        OBJECTIVE:        Current admission status: Inpatient  Preferred Pharmacy:   [id number] [address] [facility] [address]  Phone: [phone] Fax: [phone]    Primary Care Provider: No primary care provider on file. Primary Insurance: [facility] RE[facility]  Secondary Insurance:  FOR LIFE    PROGRESS NOTE:  CM called and left a message for the Pt's daughter Adalid Maynard requesting a call back to discuss d/c planning surrounding possible [facility] services. CM will continue to follow.

## 2023-11-20 NOTE — PHYSICAL THERAPY NOTE
PHYSICAL THERAPY NOTE          Patient Name: Christy Lacey  AZJXM'T Date: 11/20/2023 11/20/23 1441   PT Last Visit   PT Visit Date 11/20/23   Note Type   Note Type Treatment   Pain Assessment   Pain Assessment Tool 0-10   Pain Score No Pain   Restrictions/Precautions   Other Precautions Chair Alarm; Bed Alarm; Fall Risk;Hard of hearing   General   Chart Reviewed Yes   Response to Previous Treatment Patient with no complaints from previous session. Family/Caregiver Present No   Cognition   Overall Cognitive Status Impaired   Arousal/Participation Alert; Responsive; Cooperative   Attention Attends with cues to redirect   Orientation Level Oriented X4   Memory Decreased short term memory;Decreased recall of recent events   Following Commands Follows one step commands with increased time or repetition   Comments pt was pleasant and cooperative throughout PT intervention   Subjective   Subjective pt was agreeable to participate in PT intervention. pt stated 0/10 pain pre/post tx session   Bed Mobility   Rolling R Unable to assess   Rolling L Unable to assess   Supine to Sit Unable to assess   Sit to Supine 6  Modified independent   Additional items Assist x 1;HOB elevated; Bedrails; Increased time required   Additional Comments pt was able to return to supine and reposition towards HOB mod I as pt utilized bed rail in order to reposition   Transfers   Sit to Stand 6  Modified independent   Additional items Assist x 1; Increased time required   Stand to Sit 6  Modified independent   Additional items Increased time required;Verbal cues   Toilet transfer 6  Modified independent   Additional items Assist x 1; Increased time required;Standard toilet   Additional Comments pt was able to complete multiple functional transfers to and from RW with a decrease in level of assistanec required mod I as pt demonstrated good recall on hand placement while ascending to RW and descending back to toilet/EOB   Ambulation/Elevation   Gait pattern Decreased foot clearance; Excessively slow; Step through pattern   Gait Assistance 5  Supervision   Additional items Assist x 1;Verbal cues   Assistive Device Rolling walker   Distance 160'x1 RW   Stair Management Assistance 4  Minimal assist   Additional items Assist x 1;Verbal cues; Increased time required   Stair Management Technique Two rails; Step to pattern; Foreward;Backward   Number of Stairs 10  (First 7 steps required /s but last 3 steps required min Ax1 due to fatigue. bilateral hand rails utilized for stair trials)   Ambulation/Elevation Additional Comments pt required a therapeutic seated rest break after 160'x1 RW of ambulation due tro fatigue   Balance   Static Sitting Good   Static Standing Fair +   Ambulatory Fair   Endurance Deficit   Endurance Deficit Yes   Endurance Deficit Description pt reported fatigue post ambulation in todays tx session as pt required several minutes of therapeutic seated rest break in todays tx session   Activity Tolerance   Activity Tolerance Patient limited by fatigue   Medical Staff Made Aware Spoke to RN   Nurse Made Aware Spoke to RN   Exercises   Hip Flexion Standing;10 reps;AROM; Bilateral  (bed rail UE support)   Hip Abduction Standing;10 reps;AROM; Bilateral  (bed rail UE support)   Hip Extension Standing;10 reps;AROM; Bilateral  (bed rail UE support and min Ax1 for safety and balance)   Hip Adduction Standing;10 reps;AROM; Bilateral  (bed rail UE support)   Knee AROM Long Arc Quad Sitting;15 reps;AROM; Bilateral   Ankle Pumps Sitting;20 reps;AROM; Bilateral   Marching Sitting;15 reps;AROM; Bilateral   Neuro re-ed pt was able to stand at sink and brush his teeth, wash his hands and face > 4 minutes w/o LOB. Assessment   Prognosis Fair   Problem List Decreased strength;Decreased endurance; Impaired balance;Decreased mobility; Decreased cognition; Impaired judgement;Decreased safety awareness; Impaired hearing  (gait deviation s)   Assessment pt began tx session in bathroom and was agreeable to participate in PT intervention. pt continues to remain consistant with requiring mod i for all bed mobility and functional transfers to and from RW. pt demonstrated good recall from previous tx sessions on hand placement while ascending to RW and descending to toilet/EOB in order to increase safety and balance. pt was able to increase activity tolerance, ambulation distance and steps completed in todays tx session. pt ambulated 160'x1 RW w/ close /s , no LOB but did demonstrate degradation of gait due to fatigue as pt required  a therapeutic seated rest break post ambulation. pt educated w/ verbal/visual demonstration on all safe stair trial strategies. pt did complete 10 steps in [facility] tx session. pt was able to complete initial 7 steps with close /s but last 3 steps required min Ax1 with bilateral hand rail use. pt would benefit from continued skilled PT intervention in order to increase activity tolerance, ambulation distance and steps completed w/ less assistance required. Post tx pt in bed with call bell and all pt needs met. Barriers to Discharge Decreased caregiver support   Goals   Patient Goals none stated this tx session   STG Expiration Date 11/27/23   PT Treatment Day 2   Plan   Treatment/Interventions Functional transfer training;LE strengthening/ROM; Elevations; Therapeutic exercise; Endurance training;Patient/family training;Equipment eval/education; Bed mobility;Gait training;Spoke to nursing;Cognitive reorientation   Progress Progressing toward goals   PT Frequency 3-5x/wk   Discharge Recommendation   Rehab Resource Intensity Level, PT III (Minimum Resource Intensity)   Equipment Recommended Walker   AM-PAC Basic Mobility Inpatient   Turning in Flat Bed Without Bedrails 4   Lying on Back to Sitting on Edge of Flat Bed Without Bedrails 3   Moving Bed to Chair 3   Standing Up From Chair Using Arms 3   Walk in Room 3   Climb 3-5 Stairs With Railing 3   Basic Mobility Inpatient Raw Score 19   Basic Mobility Standardized Score 42.48   Highest Level Of Mobility   JH-HLM Goal 6: Walk 10 steps or more   JH-HLM Achieved 7: Walk 25 feet or more   Education   Education Provided Mobility training;Assistive device  (stair trials)   Patient Reinforcement needed   End of Consult   Patient Position at End of Consult Supine;Bed/Chair alarm activated; All needs within reach   The patient's AM-PAC Basic Mobility Inpatient Short Form Raw Score is 19. A Raw score of greater than 16 suggests the patient may benefit from discharge to home. Please also refer to the recommendation of the Physical Therapist for safe discharge planning.     Connie Cohn

## 2023-11-20 NOTE — ASSESSMENT & PLAN NOTE
Recent Labs     11/19/23  0444 11/20/23  0522 11/21/23  0441   HGB 9.4* 9.8* 9.8*   MCV 95 94 94     Likely multifactorial in context of malignancy history    Presented with complaint of generalized weakness and shortness of breath for 3 days  Also complained of heavy on/off chest pain for 3 days  POA Hgb 7.5  Was given 1 PRBC Unit in the ED  Patient Hgb 9.7 on 11/14 at [facility]  No dark stools  Heme occult test positive in the ED  normal lactic acid  Breathing on room air  GI following, do no suspect GI bleed as Hb stable and no signs of bleeding  Denies bloody bowel movements, has been having daily Bms  Home Eliquis resumed per GI, as bleed not suspected  As of discharge, hemoglobin stable with no signs of bleeding    Plan:   Outpatient follow up with PCP and Hematology

## 2023-11-20 NOTE — PROGRESS NOTES
Progress Note - Para Danyelle 82 y.o. male MRN: [id number]    Unit/Bed#: S -01 Encounter: [id number]    Assessment and Plan:   Principal Problem:    Generalized weakness  Active Problems:    Anemia    Acute on chronic heart failure (HCC)    Hyponatremia    Prostate cancer (HCC)    Diabetes (HCC)    Metabolic acidosis, NAG, bicarbonate losses    #1. Acute on chronic anemia: no overt GI bleeding, having dark brown stools, iron level is normal, on Eliquis for a fib. On hormonal therapy for metastatic prostate cancer, after 1 unit, hgb has stabilized in the 9's for days. Suspect anemia is multifactorial in the setting of malignancy history.  -can resume anticoagulation  -no plans for invasive GI work up at this time unless patient has recurrent drop in hgb and/or active signs of melena or blood in stool. -GI will sign off  -discussed with SLIM      ----------------------------------------------------------------------------------------------------------------    Subjective:     No melena, no rectal bleeding, no abdominal pain     Objective:     Vitals: Blood pressure 137/82, pulse (!) 109, temperature 98.4 °F (36.9 °C), resp. rate 17, height 5' 9" (1.753 m), weight 75.1 kg (165 lb 9.1 oz), SpO2 97 %. ,Body mass index is 24.45 kg/m².       Intake/Output Summary (Last 24 hours) at 11/20/2023 1114  Last data filed at 11/20/2023 0522  Gross per 24 hour   Intake 150 ml   Output 1625 ml   Net -1475 ml       Physical Exam:     General Appearance: Alert, appears stated age and cooperative  Lungs: Clear to auscultation bilaterally, no rales or rhonchi, no labored breathing/accessory muscle use  Heart: Regular rate and rhythm, S1, S2 normal, no murmur, click, rub or gallop  Abdomen: Soft, non-tender, non-distended; bowel sounds normal; no masses or no organomegaly; ostomy for urine output present in RLQ  Extremities: No cyanosis, clubbing, or edema    Invasive Devices       Peripheral Intravenous Line  Duration Peripheral IV 11/20/23 Distal;Right;Ventral (anterior) Forearm <1 day              Drain  Duration             Urostomy RLQ 3 days                    Lab Results:  Results from last 7 days   Lab Units 11/20/23  0522   WBC Thousand/uL 8.69   HEMOGLOBIN g/dL 9.8*   HEMATOCRIT % 30.9*   PLATELETS Thousands/uL 161   NEUTROS PCT % 40*   LYMPHS PCT % 44   MONOS PCT % 9   EOS PCT % 4     Results from last 7 days   Lab Units 11/20/23  0522 11/18/23  0514 11/17/23  0505   POTASSIUM mmol/L 4.5   < > 4.4   CHLORIDE mmol/L 105   < > 106   CO2 mmol/L 20*   < > 16*   BUN mg/dL 23   < > 34*   CREATININE mg/dL 0.80   < > 0.95   CALCIUM mg/dL 7.5*   < > 7.7*   ALK PHOS U/L  --   --  301*   ALT U/L  --   --  12   AST U/L  --   --  16    < > = values in this interval not displayed. Invalid input(s): "BILI"            Imaging Studies: I have personally reviewed pertinent imaging studies. XR chest 1 view portable    Result Date: 11/16/2023  Impression: Changes above are consistent with pulmonary edema and small bilateral pleural effusions. The study was marked in [facility] for immediate notification.  Workstation performed: [id number]

## 2023-11-20 NOTE — ASSESSMENT & PLAN NOTE
Assessment:  Complaint of generalized weakness for 3 days prior to admission not being able to get out of bed or dress himself. Also complained of feeling lightheaded when standing up   Uses a walker at baseline  GI is following, says GI bleed unlikely. Suspect anemia is secondary to malignancy. Reports he has been walking with walker from bed to bathroom, and feeling less weak   He has been intermittently tachycardic with heart rates in the 100s. As of discharge, patient ambulating well around room, and is overall feeling better  Patient lives at home with daughter. Discussed discharge and PT/OT recommendations with daughter via phone. She expressed understating.      Plan  Discharge home  Home PT referral placed  Outpatient follow up with PCP

## 2023-11-20 NOTE — PLAN OF CARE
Problem: Potential for Falls  Goal: Patient will remain free of falls  Description: INTERVENTIONS:  - Educate patient/family on patient safety including physical limitations  - Instruct patient to call for assistance with activity   - Consult OT/PT to assist with strengthening/mobility   - Keep Call bell within reach  - Keep bed low and locked with side rails adjusted as appropriate  - Keep care items and personal belongings within reach  - Initiate and maintain comfort rounds  - Make Fall Risk Sign visible to staff  - Apply yellow socks and bracelet for high fall risk patients  - Consider moving patient to room near nurses station  Outcome: Progressing     Problem: PAIN - ADULT  Goal: Verbalizes/displays adequate comfort level or baseline comfort level  Description: Interventions:  - Encourage patient to monitor pain and request assistance  - Assess pain using appropriate pain scale  - Administer analgesics based on type and severity of pain and evaluate response  - Implement non-pharmacological measures as appropriate and evaluate response  - Consider cultural and social influences on pain and pain management  - Notify physician/advanced practitioner if interventions unsuccessful or patient reports new pain  Outcome: Progressing     Problem: INFECTION - ADULT  Goal: Absence or prevention of progression during hospitalization  Description: INTERVENTIONS:  - Assess and monitor for signs and symptoms of infection  - Monitor lab/diagnostic results  - Monitor all insertion sites, i.e. indwelling lines, tubes, and drains  - Monitor endotracheal if appropriate and nasal secretions for changes in amount and color  - [place] appropriate cooling/warming therapies per order  - Administer medications as ordered  - Instruct and encourage patient and family to use good hand hygiene technique  - Identify and instruct in appropriate isolation precautions for identified infection/condition  Outcome: Progressing     Problem: SAFETY ADULT  Goal: Patient will remain free of falls  Description: INTERVENTIONS:  - Educate patient/family on patient safety including physical limitations  - Instruct patient to call for assistance with activity   - Consult OT/PT to assist with strengthening/mobility   - Keep Call bell within reach  - Keep bed low and locked with side rails adjusted as appropriate  - Keep care items and personal belongings within reach  - Initiate and maintain comfort rounds  - Make Fall Risk Sign visible to staff  - Apply yellow socks and bracelet for high fall risk patients  - Consider moving patient to room near nurses station  Outcome: Progressing  Goal: Maintain or return to baseline ADL function  Description: INTERVENTIONS:  -  Assess patient's ability to carry out ADLs; assess patient's baseline for ADL function and identify physical deficits which impact ability to perform ADLs (bathing, care of mouth/teeth, toileting, grooming, dressing, etc.)  - Assess/evaluate cause of self-care deficits   - Assess range of motion  - Assess patient's mobility; develop plan if impaired  - Assess patient's need for assistive devices and provide as appropriate  - Encourage maximum independence but intervene and supervise when necessary  - Involve family in performance of ADLs  - Assess for home care needs following discharge   - Consider OT consult to assist with ADL evaluation and planning for discharge  - Provide patient education as appropriate  Outcome: Progressing     Problem: HEMATOLOGIC - ADULT  Goal: Maintains hematologic stability  Description: INTERVENTIONS  - Assess for signs and symptoms of bleeding or hemorrhage  - Monitor labs  - Administer supportive blood products/factors as ordered and appropriate  Outcome: Progressing     Problem: GENITOURINARY - ADULT  Goal: Maintains or returns to baseline urinary function  Description: INTERVENTIONS:  - Assess urinary function  - Encourage oral fluids to ensure adequate hydration if ordered  - Administer IV fluids as ordered to ensure adequate hydration  - Administer ordered medications as needed  - Offer frequent toileting  - Follow urinary retention protocol if ordered  Outcome: Progressing  Goal: Absence of urinary retention  Description: INTERVENTIONS:  - Assess patient’s ability to void and empty bladder  - Monitor I/O  - Bladder scan as needed  - Discuss with physician/AP medications to alleviate retention as needed  - Discuss catheterization for long term situations as appropriate  Outcome: Progressing     Problem: METABOLIC, FLUID AND ELECTROLYTES - ADULT  Goal: Electrolytes maintained within normal limits  Description: INTERVENTIONS:  - Monitor labs and assess patient for signs and symptoms of electrolyte imbalances  - Administer electrolyte replacement as ordered  - Monitor response to electrolyte replacements, including repeat lab results as appropriate  - Instruct patient on fluid and nutrition as appropriate  Outcome: Progressing  Goal: Fluid balance maintained  Description: INTERVENTIONS:  - Monitor labs   - Monitor I/O and WT  - Instruct patient on fluid and nutrition as appropriate  - Assess for signs & symptoms of volume excess or deficit  Outcome: Progressing  Goal: Glucose maintained within target range  Description: INTERVENTIONS:  - Monitor Blood Glucose as ordered  - Assess for signs and symptoms of hyperglycemia and hypoglycemia  - Administer ordered medications to maintain glucose within target range  - Assess nutritional intake and initiate nutrition service referral as needed  Outcome: Progressing     Problem: SKIN/TISSUE INTEGRITY - ADULT  Goal: Skin Integrity remains intact(Skin Breakdown Prevention)  Description: Assess:  -Assess extremities for adequate circulation and sensation     Bed Management:  -Have minimal linens on bed & keep smooth, unwrinkled  -Change linens as needed when moist or perspiring    Toileting:  -Offer bedside commode    Activity:  -Encourage activity and walks on unit  -Encourage or provide ROM exercises     Skin Care:  -Avoid use of baby powder, tape, friction and shearing, hot water or constrictive clothing  -Do not massage red bony areas    Next Steps:  Outcome: Progressing  Goal: Incision(s), wounds(s) or drain site(s) healing without S/S of infection  Description: INTERVENTIONS  - Assess and document dressing, incision, wound bed, drain sites and surrounding tissue  - Provide patient and family education  Outcome: Progressing  Goal: Pressure injury heals and does not worsen  Description: Interventions:  - Implement low air loss mattress or specialty surface (Criteria met)  - Apply silicone foam dressinghours   - Utilize friction reducing device or surface for transfers     - Consider nutrition services referral as needed  Outcome: Progressing

## 2023-11-20 NOTE — PROGRESS NOTES
[id number]  Progress Note  Name: Kayleigh Gonzalez  MRN: [id number]  Unit/Bed#: S -37 I Date of Admission: 11/16/2023   Date of Service: 11/20/2023 I Hospital Day: 4    Assessment/Plan   Metabolic acidosis, NAG, bicarbonate losses  Assessment & Plan  Assessment:  Metabolic acidosis non anion gap on VBG  Albumin corrected anion gap was 11.0 mEq/L  Currently saturating well on room air. Plan:  Continue home sodium bicarbonate 650 mg 3 times daily   Low threshold to start bicarbonate infusion if unimproved    Diabetes [facility])  Assessment & Plan  Lab Results   Component Value Date    HGBA1C 6.0 (H) 11/04/2023     Recent Labs     11/19/23  2047 11/20/23  0735 11/20/23  1105 11/20/23  1536   POCGLU 132 115 103 112   Blood Sugar Average: Last 72 hrs:  (P) 129.2    Assessment:  On Januvia at home  Glucose level stable currently with an average of 152    Plan:  Placed on ISS   Carbohydrate controlled diet  Continue to monitor blood glucose levels  Will adjust glucose regiment as needed      Prostate cancer [facility])  Assessment & Plan  Assessment:  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 Given his Ostomy, UA and urine microscopy are not reliable  ID curbsided, suspect Urine culture is not acute infection, in absence of symptoms or labs suggesting infectious    Plan:  Continue home Bicalutamide   Monitor off antibiotics    Hyponatremia  Assessment & Plan  Assessment:  POA Sodium 129  Reviewing his chart, He had hyponatremia chronically  Sodium 132 stable    Plan:  A.m.  BMP  Hold IVF    Acute on chronic heart failure [facility])  Assessment & Plan  Wt Readings from Last 3 Encounters:   11/19/23 78.5 kg (173 lb 1 oz)   07/29/14 82.6 kg (182 lb)     Assessment:  According to chart, have a history of CHF  Last Echocardiogram on 3/2023: EF 60-65%  Patient does not look volume overloaded on exam, but has bilateral 1+ edema   Chest Xray: Pulmonary edema and small bilateral effusions   Patient is not on any diuretic at home-has been stopped due to hyponatremia  Lungs are clear to auscultation bilaterally, slightly + JVD  Echocardiogram showed a left ventricular ejection fraction of 55% with normal systolic function and diastolic function that was abnormal.  He does have moderate aortic stenosis as well as an elevated right ventricular systolic pressure of 58.11 mmHg. Net -2.15 L over 24 hours    Plan:  Is & Os  Daily Weights   Hold off diuresis for now     Anemia  Assessment & Plan  Assessment:  Complains of generalized weakness and shortness of breath for the past 3 days  Also complains of heavy on/off chest pain for the past 3 days  POA Hgb 7.5  Was given 1 PRBC Unit in the ED  Patient H[facility] 9.7 on 11/14 at [facility]  No dark stools  Heme occult test positive in the ED  normal lactic acid  Breathing on room air  GI following, do no suspect GI bleed as Hb stable and no signs of bleeding  Denies bloody bowel movements, did have a BM this morning     Plan:   Daily CBC  Continue home Eliquis, per GI, as bleed not suspected  Continue diabetic diet, increased fluid intake  Transfuse if Hgb < 7    * Generalized weakness  Assessment & Plan  Assessment:  Complains of generalized weakness for the past 3 days not being able to get out of bed or dress himself. Also complains of feeling lightheaded when standing up   Uses a walker at baseline  GI is following and will continue to evaluate  PT recommends more sessions to focus on progressive functional mobility training with closer monitoring of SPO2/heart rate. Reports he has been walking with walker from bed to bathroom, however feeling weak. He has been intermittently tachycardic with heart rates in the 100s.     Plan  PT/OT consult, recommend home with home rehab  Ambulate as tolerated              VTE Pharmacologic Prophylaxis: VTE Score: 9 High Risk (Score >/= 5) - Pharmacological DVT Prophylaxis Ordered: apixaban (Eliquis). Sequential Compression Devices Ordered. Mobility:   Basic Mobility Inpatient Raw Score: 19  JH-HLM Goal: 6: Walk 10 steps or more  JH-HLM Achieved: 7: Walk 25 feet or more  HLM Goal achieved. Continue to encourage appropriate mobility. Patient Centered Rounds: I performed bedside rounds with nursing staff today. Discussions with Specialists or Other Care Team Provider: GI and Cardiology notes reviewed    Education and Discussions with Family / Patient: Attempted to update  (son and daughter) via phone. Left voicemail. Current Length of Stay: 4 day(s)  Current Patient Status: Inpatient   Discharge Plan: Anticipate discharge in 24-48 hrs to home. Code Status: Level 1 - Full Code    Subjective:   Patient seen and examined sitting up in bed this morning. He says he is feeling better overall than when he came to the hospital, however he is still feeling more fatigued than usual. He says he has been eating well, and there was an completed breakfast tray at bedside. He denies fever, chills, shortness of breath, cough, chest pain, abdominal pain, nausea, vomiting, diarrhea, constipation, blood in the urine or stool. He has swelling of the right lower extremity, which he says is no different than his baseline. He denies pain in the lower extremities. All questions answered. Objective:     Vitals:   Temp (24hrs), Av.3 °F (36.8 °C), Min:98 °F (36.7 °C), Max:98.6 °F (37 °C)    Temp:  [98 °F (36.7 °C)-98.6 °F (37 °C)] 98 °F (36.7 °C)  HR:  [] 102  Resp:  [17-18] 18  BP: (136-137)/(81-82) 136/81  SpO2:  [95 %-97 %] 95 %  Body mass index is 24.45 kg/m². Input and Output Summary (last 24 hours):      Intake/Output Summary (Last 24 hours) at 2023 1550  Last data filed at 2023 0522  Gross per 24 hour Intake 150 ml   Output 1625 ml   Net -1475 ml       Physical Exam:   Physical Exam  Vitals and nursing note reviewed. Constitutional:       General: He is not in acute distress. HENT:      Head: Normocephalic and atraumatic. Right Ear: External ear normal.      Left Ear: External ear normal.      Nose: Nose normal.      Mouth/Throat:      Mouth: Mucous membranes are moist.      Pharynx: Oropharynx is clear. Eyes:      General: No scleral icterus. Conjunctiva/sclera: Conjunctivae normal.   Cardiovascular:      Rate and Rhythm: Normal rate. Rhythm irregular. Pulses: Normal pulses. Heart sounds: Normal heart sounds. Pulmonary:      Effort: Pulmonary effort is normal.      Breath sounds: Normal breath sounds. Abdominal:      General: Abdomen is flat. There is no distension. Tenderness: There is no abdominal tenderness. There is no guarding. Genitourinary:     Comments: Clear yellow urine draining from cystostomy bag  Musculoskeletal:      Cervical back: Neck supple. Right lower leg: Edema present. Left lower leg: No edema. Comments: Patient states that he has edema of the right leg at baseline. Skin:     General: Skin is warm and dry. Neurological:      General: No focal deficit present. Mental Status: He is alert and oriented to person, place, and time.    Psychiatric:         Mood and Affect: Mood normal.          Additional Data:     Labs:  Results from last 7 days   Lab Units 11/20/23  0522   WBC Thousand/uL 8.69   HEMOGLOBIN g/dL 9.8*   HEMATOCRIT % 30.9*   PLATELETS Thousands/uL 161   NEUTROS PCT % 40*   LYMPHS PCT % 44   MONOS PCT % 9   EOS PCT % 4     Results from last 7 days   Lab Units 11/20/23  0522 11/18/23  0514 11/17/23  0505   SODIUM mmol/L 132*   < > 131*   POTASSIUM mmol/L 4.5   < > 4.4   CHLORIDE mmol/L 105   < > 106   CO2 mmol/L 20*   < > 16*   BUN mg/dL 23   < > 34*   CREATININE mg/dL 0.80   < > 0.95   ANION GAP mmol/L 7   < > 9   CALCIUM mg/dL 7.5*   < > 7.7*   ALBUMIN g/dL  --   --  3.2*   TOTAL BILIRUBIN mg/dL  --   --  0.53   ALK PHOS U/L  --   --  301*   ALT U/L  --   --  12   AST U/L  --   --  16   GLUCOSE RANDOM mg/dL 92   < > 106    < > = values in this interval not displayed. Results from last 7 days   Lab Units 11/20/23  1536 11/20/23  1105 11/20/23  0735 11/19/23  2047 11/19/23  1601 11/19/23  1556 11/19/23  1052 11/19/23  0734 11/18/23  2049 11/18/23  1623 11/18/23  1122 11/18/23  0640   POC GLUCOSE mg/dl 112 103 115 132 127 314* 131 94 134 133 132 101         Results from last 7 days   Lab Units 11/16/23  1440   LACTIC ACID mmol/L 0.4*       Lines/Drains:  Invasive Devices       Peripheral Intravenous Line  Duration             Peripheral IV 11/20/23 Distal;Right;Ventral (anterior) Forearm <1 day              Drain  Duration             Urostomy RLQ 3 days                          Imaging: Reviewed radiology reports from this admission including: chest xray  XR chest 1 view portable  Result Date: 11/16/2023  Impression: Changes above are consistent with pulmonary edema and small bilateral pleural effusions. The study was marked in [facility] for immediate notification.  Workstation performed: [id number]     Recent Cultures (last 7 days):   Results from last 7 days   Lab Units 11/16/23  1336   URINE CULTURE  >100,000 cfu/ml Pseudomonas aeruginosa*  >100,000 cfu/ml Enterococcus faecium*       Last 24 Hours Medication List:   Current Facility-Administered Medications   Medication Dose Route Frequency Provider Last Rate    amLODIPine  5 mg Oral Daily Davonte Banda MD      apixaban  5 mg Oral BID Nan Reynolds MD      benzonatate  200 mg Oral TID Alan Diaz MD      bicalutamide  50 mg Oral Daily Davonte Banda MD      carvedilol  25 mg Oral BID With Meals Davonte Banda MD      docusate sodium  100 mg Oral BID Davonte Banda MD      ferrous sulfate  325 mg Oral Daily With Breakfast Davonte Banda MD      fluticasone  2 spray Nasal Daily Poppy Marianne Chang MD      insulin lispro  1-6 Units Subcutaneous 4x Daily (AC & HS) Sara Naqvi MD      pantoprazole  40 mg Intravenous Q12H [facility] Dianna Morataya MD      pravastatin  80 mg Oral Daily With Shelbie Burrell MD      sodium bicarbonate  650 mg Oral TID after meals Sara Naqvi MD          Today, Patient Was Seen By: Sue Parsons MD    **Please Note: This note may have been constructed using a voice recognition system. **

## 2023-11-20 NOTE — ASSESSMENT & PLAN NOTE
Lab Results   Component Value Date    HGBA1C 6.0 (H) 11/04/2023     Recent Labs     11/20/23  1105 11/20/23  1536 11/20/23 2051 11/21/23  0743   POCGLU 103 112 138 90   Blood Sugar Average: Last 72 hrs:  (P) 133.125    Assessment:  On Januvia at home  Placed on ISS while inpatient  Glucose level stable currently with an average of 133    Plan:  Resume home regimen on discharge  Outpatient follow up with PCP

## 2023-11-20 NOTE — ASSESSMENT & PLAN NOTE
Assessment:  POA Sodium 129  Reviewing his chart, He had hyponatremia chronically likely related to malignancy  Sodium 131 stable    Plan:  Outpatient follow up with PCP

## 2023-11-20 NOTE — PLAN OF CARE
Problem: OCCUPATIONAL THERAPY ADULT  Goal: Performs self-care activities at highest level of function for planned discharge setting. See evaluation for individualized goals. Description: Treatment Interventions: ADL retraining, Functional transfer training, UE strengthening/ROM, Endurance training, Cognitive reorientation, Patient/family training, Equipment evaluation/education, Compensatory technique education, Energy conservation, Activityengagement          See flowsheet documentation for full assessment, interventions and recommendations. Outcome: Progressing  Note: Limitation: Decreased ADL status, Decreased UE strength, Decreased endurance, Decreased Safe judgement during ADL, Decreased cognition, Decreased self-care trans, Decreased high-level ADLs  Prognosis: Good  Assessment: Patient is a 82 y.o. male seen for OT evaluation and cognitive evaluation  at [address] [place] [address] following admission on 11/16/2023  s/p Generalized weakness. Please see above for comprehensive list of comorbidities and significant PMHx impacting functional performance. At baseline, pt is (I) c ADLs and functional mobility with use of RW, most recently. FUNMILAYO for basic IADLs. Lives in [place] with daughter. (+) driving and falls. Retired. Jennifer Pack Upon initial evaluation, pt appears to be performing below baseline functional status. Pt requires FUNMILAYO for UB ADLs, supervision for LB ADLs, FUNMILAYO for bed mobility, FUNMILAYO for transfers and FUNMILAYO for functional mobility household distance with RW. The AM-PAC & Barthel Index outcome tools were used to assist in determining pt safety w/self care /mobility and appropriate d/c recommendations, see above for score. Occupational performance is affected by the following deficits: endurance , decreased standing tolerance for self care tasks , decreased activity tolerance , and impaired memory .  Personal/Environmental factors impacting D/C include: (+) Hx of falls , decreased social/family support within the home, Assistance needed for ADL/IADLs, Assistance needed for ADLs and functional mobility, High fall risk , decreased insight toward deficits , and decreased recall of precautions . Supporting factors include: able to maintain FFSU, accessible home environment, support system available, and attitude towards recovery Patient would benefit from OT services within the acute care setting to maximize level of functional independence in the following areas fall prevention , self-care transfers, bed mobility , functional mobility, formal cognitive evaluation, and ADLs. From OT standpoint, recommendation at time of D/C would be to level III (Minimum Resource Intensity).      Rehab Resource Intensity Level, OT: (S) III (Minimum Resource Intensity) (pending assistance and family support available at home)     Meghna Conklin, [id number] [facility] OTR/L   [place] Licensure# [id number]

## 2023-11-20 NOTE — ASSESSMENT & PLAN NOTE
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 faecium, likely colonization. Given his Ostomy, UA and urine microscopy are not reliable  ID curbsided, suspect Urine culture is not acute infection, in absence of symptoms or labs suggesting infection. No indication for antibiotics.      Plan:  Continue home Bicalutamide   Outpatient Heme Onc follow up

## 2023-11-20 NOTE — DISCHARGE INSTR - AVS FIRST PAGE
Dear Tony Meier,     [facility] It is our hope that we were always able to exceed the expected standards for your care during your stay. You were hospitalized due to Weakness. You were cared for on the 4th floor by Nery Murphy MD under the service of Krysten Buckley MD with the [facility] who covers for your primary care physician (PCP), No primary care provider on file. , while you were hospitalized. If you have any questions or concerns related to this hospitalization, you may contact us at [phone]. For follow up as well as any medication refills, we recommend that you follow up with your primary care physician. A registered nurse will reach out to you by phone within a few days after your discharge to answer any additional questions that you may have after going home. However, at this time we provide for you here, the most important instructions / recommendations at discharge:     Notable Medication Adjustments -   No medication changes  Testing Required after Discharge -   Important follow up information -   Please follow-up with primary care provider in 1-2 weeks. Call to make an appointment  Please follow-up with your hematologist after discharge. Call to make an appointment  Other Instructions -   If you have any worsening of symptoms, please call your doctor or return to the Emergency Room  A referral has been placed for physical therapy  Please review this entire after visit summary as additional general instructions including medication list, appointments, activity, diet, any pertinent wound care, and other additional recommendations from your care team that may be provided for you.       Sincerely,     Nery Murphy MD

## 2023-11-20 NOTE — ASSESSMENT & PLAN NOTE
Wt Readings from Last 3 Encounters:   11/20/23 75.1 kg (165 lb 9.1 oz)   07/29/14 82.6 kg (182 lb)     Assessment:  According to chart, have a history of CHF  Last Echocardiogram on 3/2023: EF 60-65%  Patient does not look volume overloaded on exam, but has bilateral 1+ edema   Chest Xray: Pulmonary edema and small bilateral effusions   Patient is not on any diuretic at home-has been stopped due to hyponatremia  Lungs are clear to auscultation bilaterally, slightly + JVD  Echocardiogram showed a left ventricular ejection fraction of 55% with normal systolic function and diastolic function that was abnormal.  He does have moderate aortic stenosis as well as an elevated right ventricular systolic pressure of 76.54 mmHg.   Per cardiology, patient does not appear to be in decompensated heart failure, and does not need aggressive diuresis    Plan:  Outpatient follow up with PCP

## 2023-11-20 NOTE — OCCUPATIONAL THERAPY NOTE
Occupational Therapy Evaluation/Cognitive Evaluation     Patient Name: Jay SANTIAGODVOTENZIN Date: 11/20/2023  Problem List  Principal Problem:    Generalized weakness  Active Problems:    Anemia    Acute on chronic heart failure (HCC)    Hyponatremia    Prostate cancer (HCC)    Diabetes (HCC)    Metabolic acidosis, NAG, bicarbonate losses    Past Medical History  No past medical history on file. Past Surgical History  No past surgical history on file. 11/20/23 1455   OT Last Visit   OT Visit Date 11/20/23   Note Type   Note type Evaluation  (+ cognitive evaluation [id number])   Pain Assessment   Pain Assessment Tool 0-10   Pain Score No Pain   Hospital Pain Intervention(s) Repositioned   Multiple Pain Sites No   Restrictions/Precautions   Weight Bearing Precautions Per Order No   Other Precautions Cognitive; Chair Alarm; Bed Alarm; Fall Risk;Hard of hearing   Home Living   Type of Home Assisted living  [facility])   Home Layout One level;Performs ADLs on one level; Able to live on main level with bedroom/bathroom; Other (Comment)  (0 GABY)   Bathroom Shower/Tub Tub/shower unit  (cut out tub/shower)   Bathroom Toilet Standard   Bathroom Equipment Grab bars in shower; Shower chair;Grab bars around toilet   [address] Walker;Grab bars   Prior Function   Level of Micro Independent with ADLs; Independent with functional mobility; Needs assistance with IADLS   Lives With Daughter  (who works during the day)   [address] with driving; Independent with medication management; Family/Friend/Other provides meals  ([facility] provides 3 meals per day. Daughter assists with laundry, cleaning and home managment tasks.)   Falls in the last 6 months 1 to 4  (pt reports 1 fall from tripping, per pt had patellar fx)   Vocational Retired   Comments Pt is a questionable historian at time of evaluation.  Pt reports being independent with dressing/bathing tasks. Ambulating most recently with use of RW. His daughter works out of the home during the day and often gets home late at night. Pt reports seeing her infrequently. Lifestyle   Autonomy At baseline pt is (I) c ADLs and functional mobility with use of RW, most recently. FUNMILAYO for basic IADLs. Lives in [place] with daughter. (+) driving and falls. Retired. Reciprocal Relationships Supportive family   Service to Others Retired   General   Additional Pertinent History [facility]16[facility]2023 c generalized weakness and shortness of breath for the past 3 days. Family/Caregiver Present No   Additional General Comments PMHx: metastic prostate cancer s/p cystoprostatectomy, hypertension, diabetes, hyperlipidemia, GERD, CHF, A-fib, history of CLL   Subjective   Subjective "I just do not feel ready to go home."   ADL   Eating Assistance 6  Modified independent   Grooming Assistance 6  Modified Independent   UB Bathing Assistance 6  Modified Independent   LB Bathing Assistance 5  Supervision/Setup   UB Dressing Assistance 6  Modified independent   LB Dressing Assistance 5  Supervision/Setup   Toileting Assistance  5  Supervision/Setup   Toileting Deficit Other (Comment)  (to empty ostomy bag)   Additional Comments Unable to formally assess bathing, dressing, grooming and toileting at time of eval. The above levels of assistance are anticipated based on functional performance deficits with use of clinical judgement. Bed Mobility   Supine to Sit 6  Modified independent   Additional items HOB elevated   Sit to Supine 6  Modified independent   Additional items HOB elevated   Additional Comments Pt with overall good static sitting balance. Transfers   Sit to Stand 6  Modified independent   Additional items Increased time required   Stand to Sit 6  Modified independent   Additional items Increased time required   Additional Comments Pt with good safety awareness during transfers.    Functional Mobility   Functional Mobility 6  Modified independent   Additional Comments for functional household distance in the room with use of RW. Pt demonstrates ability to problem-solve and navigate through narrow aspects of the room with RW. Spo2 >95%, HR 110s. Pt denies SOB or chest pain. Additional items Rolling walker   Balance   Static Sitting Good   Dynamic Sitting Fair +   Static Standing Fair +   Dynamic Standing Fair   Activity Tolerance   Activity Tolerance Patient limited by fatigue   Medical Staff Made Aware Spoke with care coordination   Nurse Made Aware Spoke with RN Efra   RUE Assessment   RUE Assessment WFL  (MMT 4+/5 grossly)   LUE Assessment   LUE Assessment WFL  (MMT 4+/5 grossly)   Hand Function   Gross Motor Coordination Functional   Fine Motor Coordination Functional   Sensation   Light Touch No apparent deficits   Cognition   Overall Cognitive Status Impaired  (WFL throughout session; questionable higher level cognition deficits.)   Arousal/Participation Alert; Responsive; Cooperative   Attention Within functional limits   Orientation Level Oriented X4   Memory Decreased recall of recent events   Following Commands Follows multistep commands with increased time or repetition   Comments Pt is very pleasent and cooperative. Reports feeling uneasy about returning home. States "maybe I am scared". Reoriented patient on performance and ability to participate in mobility/ADL with limited assistance. Pt agrees but continues to express concern. Cognition Assessment Tools SLUMS   Score 18   Assessment   Limitation Decreased ADL status; Decreased UE strength;Decreased endurance;Decreased Safe judgement during ADL;Decreased cognition;Decreased self-care trans;Decreased high-level ADLs   Prognosis Good   Assessment Patient is a 82 y.o. male seen for OT evaluation and cognitive evaluation  at [address] [place] [address] following admission on 11/16/2023  s/p Generalized weakness.  Please see above for comprehensive list of comorbidities and significant PMHx impacting functional performance. At baseline, pt is (I) c ADLs and functional mobility with use of RW, most recently. FUNMILAYO for basic IADLs. Lives in [place] with daughter. (+) driving and falls. Retired. Kendrick Lennox Upon initial evaluation, pt appears to be performing below baseline functional status. Pt requires FUNMILAYO for UB ADLs, supervision for LB ADLs, FUNMILAYO for bed mobility, FUNMILAYO for transfers and FUNMILAYO for functional mobility household distance with RW. The AM-PAC & Barthel Index outcome tools were used to assist in determining pt safety w/self care /mobility and appropriate d/c recommendations, see above for score. Occupational performance is affected by the following deficits: endurance , decreased standing tolerance for self care tasks , decreased activity tolerance , and impaired memory . Personal/Environmental factors impacting D/C include: (+) Hx of falls , decreased social/family support within the home, Assistance needed for ADL/IADLs, Assistance needed for ADLs and functional mobility, High fall risk , decreased insight toward deficits , and decreased recall of precautions . Supporting factors include: able to maintain FFSU, accessible home environment, support system available, and attitude towards recovery Patient would benefit from OT services within the acute care setting to maximize level of functional independence in the following areas fall prevention , self-care transfers, bed mobility , functional mobility, formal cognitive evaluation, and ADLs. From OT standpoint, recommendation at time of D/C would be to level III (Minimum Resource Intensity). Goals   Patient Goals "to have help at home"   LTG Time Frame 10-14   Long Term Goal See below   Plan   Treatment Interventions ADL retraining;Functional transfer training;UE strengthening/ROM; Endurance training;Cognitive reorientation;Patient/family training;Equipment evaluation/education; Compensatory technique education; Energy conservation; Activityengagement   Goal Expiration Date 11/30/23   OT Treatment Day 1   OT Frequency 2-3x/wk   Discharge Recommendation   Rehab Resource Intensity Level, OT (S)  III (Minimum Resource Intensity)  (pending assistance and family support available upon discharge.)   Additional Comments  The patient's raw score on the -PAC Daily Activity Inpatient Short Form is 21. A raw score of greater than or equal to 19 suggests the patient may benefit from discharge to home. Please refer to the recommendation of the Occupational Therapist for safe discharge planning. AM-PAC Daily Activity Inpatient   Lower Body Dressing 3   Bathing 3   Toileting 3   Upper Body Dressing 4   Grooming 4   Eating 4   Daily Activity Raw Score 21   Daily Activity Standardized Score (Calc for Raw Score >=11) 44.27   AM-PAC Applied Cognition Inpatient   Following a Speech/Presentation 3   Understanding Ordinary Conversation 4   Taking Medications 3   Remembering Where Things Are Placed or Put Away 3   Remembering List of 4-5 Errands 3   Taking Care of Complicated Tasks 2   Applied Cognition Raw Score 18   Applied Cognition Standardized Score 38.07   SLUMS   What day of the week is it? 1   What is the year? 1   What state are we in? 1   How much did you spend? 1   How much do you have left? 2   Name animals? 2   Oject recall? 0   Repeat numbers backwards? 0   Draw a clock? 4   Identify a triangle 1   Determine size? 1   What was the females name? 2   When did she go back to work? 0   What work did she do? 2   What state did she live in? 0   Calculated SLUMS Score 18   SLUMS Comments (S)  Participating in [place] Examination, pt scoring 18/30. Pt has a high school level education, scores less than 20 for people with this level of education are consistent with dementia. Recommend continued evaluation and assessment for cognitive decline as well as a consideration for fitness to drive evaluation.    Barthel Index   Feeding 10   Bathing 0 Grooming Score 5   Dressing Score 10   Bladder Score 10   Bowels Score 10   Toilet Use Score 5   Transfers (Bed/Chair) Score 10   Mobility (Level Surface) Score 0   Stairs Score 5   Barthel Index Score 65   Additional Treatment Session   Start Time 1505   End Time 1520   Treatment Assessment Pt participated in [address] following IE to further evaluate cognitive function. Pt's overall score 18/30, scores less than 20 for a patient a high school education are consistent with dementia. Recommend futher cognitive evaluation for early dementing disorder as well as a consideration for fitness to drive evaluation. Deficits noted in delayed recall with interference, numeric calculation and registration, immediate recall with time restraint, executive functioning. Encourage family to continue assisting / supervising with medication management and safety within the home. Level III (Minimum Resource Intensity) recommended for continued cognitive evaluation/recommendations within the home following d/c. At the end of the assessment, pt reports that his head is just not right. He is able to acknowledge his deficits and inability to attend to/recall specific questions/answers. End of Consult   Education Provided Yes   Patient Position at End of Consult Supine;Bed/Chair alarm activated; All needs within reach   Nurse Communication Nurse aware of consult     Goals established on initial evaluation in order to achieve pt's goal of having help at home. Pt will complete LB ADLs Independent   for increased ADL independence within 10 days. Pt will complete toileting Independent   with use of DME for increased ADL independence within 10 days. Pt will demonstrate proper body mechanics to complete self-care transfers and functional mobility with Mod independent  and use of LRAD for increased safety and functional independence within 10 days.      Pt will perform grooming tasks standing at the sink with Independent in order to increase overall independence and return to PLOF. Pt will demonstrate standing tolerance of 10 min with Mod independent  and use of LRAD for increased activity tolerance during ADL/IADL tasks within 10 days. Pt will demonstrate proper body mechanics and fall prevention strategies during 100% of tx sessions for increased safety awareness during ADL/IADLs    Pt will improve functional activity tolerance to 30 mins of sustained functional tasks to increase participation in basic self-care tasks and minimize assistance level. Pt will receive education on use of compensatory memory strategies for increased safety and independent with IADL tasks. Pt will participate in ongoing cognitive assessments (ACLS) to assist with safe D/C planning and supervision/assistance recommendations. Pt will verbalize and demonstrate understanding of B UE HEP program increase strength and endurance during self care transfers within 10 days. Pt will demonstrate OOB sitting tolerance of 2-4 hr/day for increased activity tolerance and engagement in leisure activities within 10 days.        Alton Hernandes, [id number] [facility] OTR/L   [place] Licensure# [id number]

## 2023-11-21 VITALS
RESPIRATION RATE: 18 BRPM | HEART RATE: 103 BPM | BODY MASS INDEX: 24.52 KG/M2 | WEIGHT: 165.57 LBS | HEIGHT: 69 IN | TEMPERATURE: 98.6 F | DIASTOLIC BLOOD PRESSURE: 89 MMHG | SYSTOLIC BLOOD PRESSURE: 147 MMHG | OXYGEN SATURATION: 94 %

## 2023-11-21 LAB
ANION GAP SERPL CALCULATED.3IONS-SCNC: 7 MMOL/L
BUN SERPL-MCNC: 21 MG/DL (ref 5–25)
CALCIUM SERPL-MCNC: 7.4 MG/DL (ref 8.4–10.2)
CHLORIDE SERPL-SCNC: 104 MMOL/L (ref 96–108)
CO2 SERPL-SCNC: 20 MMOL/L (ref 21–32)
CREAT SERPL-MCNC: 0.79 MG/DL (ref 0.6–1.3)
ERYTHROCYTE [DISTWIDTH] IN BLOOD BY AUTOMATED COUNT: 16.1 % (ref 11.6–15.1)
GFR SERPL CREATININE-BSD FRML MDRD: 81 ML/MIN/1.73SQ M
GLUCOSE SERPL-MCNC: 154 MG/DL (ref 65–140)
GLUCOSE SERPL-MCNC: 90 MG/DL (ref 65–140)
GLUCOSE SERPL-MCNC: 94 MG/DL (ref 65–140)
HCT VFR BLD AUTO: 30.1 % (ref 36.5–49.3)
HGB BLD-MCNC: 9.8 G/DL (ref 12–17)
MCH RBC QN AUTO: 30.6 PG (ref 26.8–34.3)
MCHC RBC AUTO-ENTMCNC: 32.6 G/DL (ref 31.4–37.4)
MCV RBC AUTO: 94 FL (ref 82–98)
PLATELET # BLD AUTO: 146 THOUSANDS/UL (ref 149–390)
PMV BLD AUTO: 9.6 FL (ref 8.9–12.7)
POTASSIUM SERPL-SCNC: 4.7 MMOL/L (ref 3.5–5.3)
RBC # BLD AUTO: 3.2 MILLION/UL (ref 3.88–5.62)
SODIUM SERPL-SCNC: 131 MMOL/L (ref 135–147)
WBC # BLD AUTO: 10.32 THOUSAND/UL (ref 4.31–10.16)

## 2023-11-21 PROCEDURE — C9113 INJ PANTOPRAZOLE SODIUM, VIA: HCPCS | Performed by: HOSPITALIST

## 2023-11-21 PROCEDURE — 99239 HOSP IP/OBS DSCHRG MGMT >30: CPT | Performed by: HOSPITALIST

## 2023-11-21 PROCEDURE — 82948 REAGENT STRIP/BLOOD GLUCOSE: CPT

## 2023-11-21 PROCEDURE — 80048 BASIC METABOLIC PNL TOTAL CA: CPT

## 2023-11-21 PROCEDURE — 85027 COMPLETE CBC AUTOMATED: CPT

## 2023-11-21 RX ORDER — SODIUM BICARBONATE 650 MG/1
650 TABLET ORAL 2 TIMES DAILY
Qty: 8 TABLET | Refills: 0 | Status: SHIPPED | OUTPATIENT
Start: 2023-11-21 | End: 2023-11-25

## 2023-11-21 RX ORDER — PANTOPRAZOLE SODIUM 40 MG/1
40 TABLET, DELAYED RELEASE ORAL EVERY 12 HOURS SCHEDULED
Status: DISCONTINUED | OUTPATIENT
Start: 2023-11-21 | End: 2023-11-21 | Stop reason: HOSPADM

## 2023-11-21 RX ADMIN — CARVEDILOL 25 MG: 12.5 TABLET, FILM COATED ORAL at 08:32

## 2023-11-21 RX ADMIN — BICALUTAMIDE 50 MG: 50 TABLET, FILM COATED ORAL at 08:33

## 2023-11-21 RX ADMIN — BENZONATATE 200 MG: 100 CAPSULE ORAL at 08:33

## 2023-11-21 RX ADMIN — AMLODIPINE BESYLATE 5 MG: 5 TABLET ORAL at 08:33

## 2023-11-21 RX ADMIN — SODIUM BICARBONATE 650 MG TABLET 650 MG: at 08:33

## 2023-11-21 RX ADMIN — DOCUSATE SODIUM 100 MG: 100 CAPSULE, LIQUID FILLED ORAL at 08:33

## 2023-11-21 RX ADMIN — APIXABAN 5 MG: 5 TABLET, FILM COATED ORAL at 08:33

## 2023-11-21 RX ADMIN — PANTOPRAZOLE SODIUM 40 MG: 40 INJECTION, POWDER, FOR SOLUTION INTRAVENOUS at 08:33

## 2023-11-21 RX ADMIN — FERROUS SULFATE TAB 325 MG (65 MG ELEMENTAL FE) 325 MG: 325 (65 FE) TAB at 08:33

## 2023-11-21 RX ADMIN — FLUTICASONE PROPIONATE 2 SPRAY: 50 SPRAY, METERED NASAL at 08:33

## 2023-11-21 NOTE — PLAN OF CARE
Problem: Potential for Falls  Goal: Patient will remain free of falls  Description: INTERVENTIONS:  - Educate patient/family on patient safety including physical limitations  - Instruct patient to call for assistance with activity   - Consult OT/PT to assist with strengthening/mobility   - Keep Call bell within reach  - Keep bed low and locked with side rails adjusted as appropriate  - Keep care items and personal belongings within reach  - Initiate and maintain comfort rounds  - Make Fall Risk Sign visible to staff  - Apply yellow socks and bracelet for high fall risk patients  - Consider moving patient to room near nurses station  Outcome: Progressing     Problem: PAIN - ADULT  Goal: Verbalizes/displays adequate comfort level or baseline comfort level  Description: Interventions:  - Encourage patient to monitor pain and request assistance  - Assess pain using appropriate pain scale  - Administer analgesics based on type and severity of pain and evaluate response  - Implement non-pharmacological measures as appropriate and evaluate response  - Consider cultural and social influences on pain and pain management  - Notify physician/advanced practitioner if interventions unsuccessful or patient reports new pain  Outcome: Progressing     Problem: INFECTION - ADULT  Goal: Absence or prevention of progression during hospitalization  Description: INTERVENTIONS:  - Assess and monitor for signs and symptoms of infection  - Monitor lab/diagnostic results  - Monitor all insertion sites, i.e. indwelling lines, tubes, and drains  - Monitor endotracheal if appropriate and nasal secretions for changes in amount and color  - [place] appropriate cooling/warming therapies per order  - Administer medications as ordered  - Instruct and encourage patient and family to use good hand hygiene technique  - Identify and instruct in appropriate isolation precautions for identified infection/condition  Outcome: Progressing     Problem: SAFETY ADULT  Goal: Patient will remain free of falls  Description: INTERVENTIONS:  - Educate patient/family on patient safety including physical limitations  - Instruct patient to call for assistance with activity   - Consult OT/PT to assist with strengthening/mobility   - Keep Call bell within reach  - Keep bed low and locked with side rails adjusted as appropriate  - Keep care items and personal belongings within reach  - Initiate and maintain comfort rounds  - Make Fall Risk Sign visible to staff  - Apply yellow socks and bracelet for high fall risk patients  - Consider moving patient to room near nurses station  Outcome: Progressing  Goal: Maintain or return to baseline ADL function  Description: INTERVENTIONS:  -  Assess patient's ability to carry out ADLs; assess patient's baseline for ADL function and identify physical deficits which impact ability to perform ADLs (bathing, care of mouth/teeth, toileting, grooming, dressing, etc.)  - Assess/evaluate cause of self-care deficits   - Assess range of motion  - Assess patient's mobility; develop plan if impaired  - Assess patient's need for assistive devices and provide as appropriate  - Encourage maximum independence but intervene and supervise when necessary  - Involve family in performance of ADLs  - Assess for home care needs following discharge   - Consider OT consult to assist with ADL evaluation and planning for discharge  - Provide patient education as appropriate  Outcome: Progressing     Problem: HEMATOLOGIC - ADULT  Goal: Maintains hematologic stability  Description: INTERVENTIONS  - Assess for signs and symptoms of bleeding or hemorrhage  - Monitor labs  - Administer supportive blood products/factors as ordered and appropriate  Outcome: Progressing     Problem: GENITOURINARY - ADULT  Goal: Maintains or returns to baseline urinary function  Description: INTERVENTIONS:  - Assess urinary function  - Encourage oral fluids to ensure adequate hydration if ordered  - Administer IV fluids as ordered to ensure adequate hydration  - Administer ordered medications as needed  - Offer frequent toileting  - Follow urinary retention protocol if ordered  Outcome: Progressing  Goal: Absence of urinary retention  Description: INTERVENTIONS:  - Assess patient’s ability to void and empty bladder  - Monitor I/O  - Bladder scan as needed  - Discuss with physician/AP medications to alleviate retention as needed  - Discuss catheterization for long term situations as appropriate  Outcome: Progressing     Problem: METABOLIC, FLUID AND ELECTROLYTES - ADULT  Goal: Electrolytes maintained within normal limits  Description: INTERVENTIONS:  - Monitor labs and assess patient for signs and symptoms of electrolyte imbalances  - Administer electrolyte replacement as ordered  - Monitor response to electrolyte replacements, including repeat lab results as appropriate  - Instruct patient on fluid and nutrition as appropriate  Outcome: Progressing  Goal: Fluid balance maintained  Description: INTERVENTIONS:  - Monitor labs   - Monitor I/O and WT  - Instruct patient on fluid and nutrition as appropriate  - Assess for signs & symptoms of volume excess or deficit  Outcome: Progressing  Goal: Glucose maintained within target range  Description: INTERVENTIONS:  - Monitor Blood Glucose as ordered  - Assess for signs and symptoms of hyperglycemia and hypoglycemia  - Administer ordered medications to maintain glucose within target range  - Assess nutritional intake and initiate nutrition service referral as needed  Outcome: Progressing     Problem: SKIN/TISSUE INTEGRITY - ADULT  Goal: Skin Integrity remains intact(Skin Breakdown Prevention)  Description: Assess:  -Assess extremities for adequate circulation and sensation     Bed Management:  -Have minimal linens on bed & keep smooth, unwrinkled  -Change linens as needed when moist or perspiring    Toileting:  -Offer bedside commode    Activity:  -Encourage activity and walks on unit  -Encourage or provide ROM exercises     Skin Care:  -Avoid use of baby powder, tape, friction and shearing, hot water or constrictive clothing  -Do not massage red bony areas    Next Steps:  Outcome: Progressing  Goal: Incision(s), wounds(s) or drain site(s) healing without S/S of infection  Description: INTERVENTIONS  - Assess and document dressing, incision, wound bed, drain sites and surrounding tissue  - Provide patient and family education  Outcome: Progressing  Goal: Pressure injury heals and does not worsen  Description: Interventions:  - Implement low air loss mattress or specialty surface (Criteria met)  - Apply silicone foam dressinghours   - Utilize friction reducing device or surface for transfers   - Consider nutrition services referral as needed  Outcome: Progressing

## 2023-11-21 NOTE — CASE MANAGEMENT
Case Management Discharge Planning Note    Patient name Joseph Davison  Location S /S -01 MRN [id number]  : 1935 Date 2023       Current Admission Date: 2023  Current Admission Diagnosis:Generalized weakness   Patient Active Problem List    Diagnosis Date Noted    Anemia 2023    Acute on chronic heart failure ([address]) 2023    Hyponatremia 2023    Generalized weakness 2023    Prostate cancer ([address]) 2023    Diabetes ([address])     Metabolic acidosis, NAG, bicarbonate losses 2023      LOS (days): 5  Geometric Mean LOS (GMLOS) (days): 3.00  Days to GMLOS:-1.9     OBJECTIVE:  Risk of Unplanned Readmission Score: 19.96         Current admission status: Inpatient   Preferred [facility]acy:   [id number] [address] [address]  Phone: [phone] Fax: [phone]    Primary Care Provider: No primary care provider on file. Primary Insurance: [facility] REP  Secondary Insurance:  FOR LIFE    DISCHARGE DETAILS:    Discharge planning discussed with[de-identified] Damari-daughter  Freedom of Choice: Yes  Comments - Freedom of Choice: CM was in contact with Casper Ocasio to review the Pt's d/c plan for today. Casper Ocasio reported she believes the Pt could benefit from some PT services, and requested a [address] referral. Casper Ocasio reported she will be transporting the Pt upon his d/c to home today.   CM contacted family/caregiver?: Yes  Were Treatment Team discharge recommendations reviewed with patient/caregiver?: Yes  Did patient/caregiver verbalize understanding of patient care needs?: Yes  Were patient/caregiver advised of the risks associated with not following Treatment Team discharge recommendations?: Yes    Contacts  Patient Contacts: Casper Ocasio  Relationship to Patient[de-identified] Family  Contact Method: Phone  Reason/Outcome: Discharge  [facility]         Is the patient interested in [address] at discharge?: Yes  [facility] requested[de-identified] Physical [address] [place] Name[de-identified] Other  [facility] Provider[de-identified] PCP  Home Health Services Needed[de-identified] Gait/ADL Training, Strengthening/Theraputic Exercises to Improve Function  Homebound Criteria Met[de-identified] Uses an Assist Device (i.e. cane, walker, etc)  Supporting Clincal Findings[de-identified] Limited Endurance    DME Referral Provided  Referral made for DME?: No    Other Referral/Resources/Interventions Provided:  Interventions: [facility]  Referral Comments: [facility] referral made for home PT services. Treatment Team Recommendation: Home with [address] [facility]  Discharge Destination Plan[de-identified] Home with [facility] at Discharge : Family        IMM Given (Date):: 11/21/23  IMM Given to[de-identified] Patient     IMM reviewed with patient and caregiver, patient and caregiver agrees with discharge determination.

## 2023-11-21 NOTE — PROGRESS NOTES
The pantoprazole has / have been converted to Oral per [facility] IV-to-PO Auto-Conversion Protocol for Adults as approved by the Pharmacy and Therapeutics Committee. The patient met all eligible criteria:    3 Age = 27years old   2) Received at least one dose of the IV form   3) Receiving at least one other scheduled oral/enteral medication   4) Tolerating an oral/enteral diet     and did not have any exclusions:     1) Critical care patient   2) Active GI bleed (IF assessing H2RAs or PPIs)   3) Continuous tube feeding (IF assessing cipro, doxycycline, levofloxacin, minocycline, rifampin, or voriconazole)   4) Receiving PO vancomycin (IF assessing metronidazole)   5) Persistent nausea and/or vomiting   6) Ileus or gastrointestinal obstruction   7) Avila/nasogastric tube set for continuous suction   8) Specific order not to automatically convert to PO (in the order's comments or if discussed in the most recent Infectious Disease or primary team's progress notes).